# Patient Record
Sex: FEMALE | Race: WHITE | Employment: UNEMPLOYED | URBAN - METROPOLITAN AREA
[De-identification: names, ages, dates, MRNs, and addresses within clinical notes are randomized per-mention and may not be internally consistent; named-entity substitution may affect disease eponyms.]

---

## 2019-07-17 ENCOUNTER — HOSPITAL ENCOUNTER (EMERGENCY)
Age: 34
Discharge: HOME | End: 2019-07-17
Attending: EMERGENCY MEDICINE

## 2019-07-17 VITALS
HEIGHT: 69 IN | WEIGHT: 123 LBS | TEMPERATURE: 98.3 F | SYSTOLIC BLOOD PRESSURE: 139 MMHG | BODY MASS INDEX: 18.22 KG/M2 | RESPIRATION RATE: 16 BRPM | OXYGEN SATURATION: 100 % | HEART RATE: 79 BPM | DIASTOLIC BLOOD PRESSURE: 80 MMHG

## 2019-07-17 NOTE — ED NOTES
On way back to treatment room patient got a phone call. Child going to camp and left permission slip in car. Went to car. Registration person states she saw her drive away.      Zi Mar RN  07/17/19 [id number]

## 2025-03-30 ENCOUNTER — APPOINTMENT (OUTPATIENT)
Dept: CT IMAGING | Age: 40
DRG: 720 | End: 2025-03-30
Payer: COMMERCIAL

## 2025-03-30 ENCOUNTER — HOSPITAL ENCOUNTER (INPATIENT)
Age: 40
LOS: 2 days | Discharge: ANOTHER HOSPITAL | DRG: 720 | End: 2025-04-01
Attending: EMERGENCY MEDICINE | Admitting: INTERNAL MEDICINE
Payer: COMMERCIAL

## 2025-03-30 ENCOUNTER — APPOINTMENT (OUTPATIENT)
Dept: GENERAL RADIOLOGY | Age: 40
DRG: 720 | End: 2025-03-30
Payer: COMMERCIAL

## 2025-03-30 DIAGNOSIS — A41.9 SEPTICEMIA (HCC): ICD-10-CM

## 2025-03-30 DIAGNOSIS — J18.9 PNEUMONIA DUE TO INFECTIOUS ORGANISM, UNSPECIFIED LATERALITY, UNSPECIFIED PART OF LUNG: ICD-10-CM

## 2025-03-30 DIAGNOSIS — R41.82 ALTERED MENTAL STATUS, UNSPECIFIED ALTERED MENTAL STATUS TYPE: Primary | ICD-10-CM

## 2025-03-30 PROBLEM — I76 SEPTIC EMBOLISM (HCC): Status: ACTIVE | Noted: 2025-03-30

## 2025-03-30 PROBLEM — R47.81 SLURRED SPEECH: Status: ACTIVE | Noted: 2025-03-30

## 2025-03-30 PROBLEM — A59.9 INFECTION DUE TO TRICHOMONAS: Status: ACTIVE | Noted: 2025-03-30

## 2025-03-30 PROBLEM — R31.9 HEMATURIA: Status: ACTIVE | Noted: 2025-03-30

## 2025-03-30 PROBLEM — D72.829 LEUKOCYTOSIS: Status: ACTIVE | Noted: 2025-03-30

## 2025-03-30 PROBLEM — Z86.14 HISTORY OF MRSA INFECTION: Status: ACTIVE | Noted: 2025-03-30

## 2025-03-30 PROBLEM — B18.2 CHRONIC HEPATITIS C (HCC): Status: ACTIVE | Noted: 2025-03-30

## 2025-03-30 LAB
ALBUMIN SERPL-MCNC: 2.4 G/DL (ref 3.5–5.2)
ALBUMIN/GLOB SERPL: 0.5 {RATIO} (ref 1–2.5)
ALLEN TEST: POSITIVE
ALP SERPL-CCNC: 554 U/L (ref 35–104)
ALT SERPL-CCNC: 37 U/L (ref 10–35)
AMMONIA PLAS-SCNC: 31 UMOL/L (ref 11–51)
AMMONIA PLAS-SCNC: 32 UMOL/L (ref 11–51)
AMPHET UR QL SCN: NEGATIVE
ANION GAP SERPL CALCULATED.3IONS-SCNC: 11 MMOL/L (ref 9–16)
AST SERPL-CCNC: 64 U/L (ref 10–35)
BARBITURATES UR QL SCN: NEGATIVE
BASOPHILS # BLD: 0 K/UL (ref 0–0.2)
BASOPHILS # BLD: NORMAL K/UL (ref 0–0.2)
BASOPHILS NFR BLD: 0 %
BASOPHILS NFR BLD: NORMAL % (ref 0–2)
BENZODIAZ UR QL: NEGATIVE
BILIRUB SERPL-MCNC: 0.9 MG/DL (ref 0–1.2)
BILIRUB UR QL STRIP: NEGATIVE
BUN SERPL-MCNC: 25 MG/DL (ref 6–20)
CALCIUM SERPL-MCNC: 10.2 MG/DL (ref 8.6–10.4)
CANNABINOIDS UR QL SCN: POSITIVE
CHLORIDE SERPL-SCNC: 103 MMOL/L (ref 98–107)
CLARITY UR: ABNORMAL
CO2 SERPL-SCNC: 19 MMOL/L (ref 20–31)
COCAINE UR QL SCN: POSITIVE
COLOR UR: YELLOW
CREAT SERPL-MCNC: 1 MG/DL (ref 0.5–0.9)
EOSINOPHIL # BLD: 0 K/UL (ref 0–0.4)
EOSINOPHIL # BLD: NORMAL K/UL (ref 0–0.4)
EOSINOPHILS RELATIVE PERCENT: 0 % (ref 1–4)
EOSINOPHILS RELATIVE PERCENT: NORMAL % (ref 1–4)
EPI CELLS #/AREA URNS HPF: ABNORMAL /HPF (ref 0–5)
ERYTHROCYTE [DISTWIDTH] IN BLOOD BY AUTOMATED COUNT: 14.1 % (ref 11.8–14.4)
ERYTHROCYTE [DISTWIDTH] IN BLOOD BY AUTOMATED COUNT: NORMAL % (ref 11.5–14.5)
ETHANOL PERCENT: NORMAL %
ETHANOLAMINE SERPL-MCNC: <10 MG/DL (ref 0–0.08)
FENTANYL UR QL: NEGATIVE
GFR, ESTIMATED: 73 ML/MIN/1.73M2
GLUCOSE BLD-MCNC: 117 MG/DL (ref 65–105)
GLUCOSE SERPL-MCNC: 113 MG/DL (ref 74–99)
GLUCOSE UR STRIP-MCNC: NEGATIVE MG/DL
HCT VFR BLD AUTO: 32.2 % (ref 36.3–47.1)
HCT VFR BLD AUTO: NORMAL % (ref 36–46)
HGB BLD-MCNC: 10.8 G/DL (ref 11.9–15.1)
HGB BLD-MCNC: NORMAL G/DL (ref 12–16)
HGB UR QL STRIP.AUTO: ABNORMAL
IMM GRANULOCYTES # BLD AUTO: 0.16 K/UL (ref 0–0.3)
IMM GRANULOCYTES NFR BLD: 1 %
INR PPP: 1.3
KETONES UR STRIP-MCNC: NEGATIVE MG/DL
LACTATE BLDV-SCNC: 1.3 MMOL/L (ref 0.5–1.9)
LACTATE BLDV-SCNC: 1.4 MMOL/L (ref 0.5–1.9)
LACTATE BLDV-SCNC: 1.7 MMOL/L (ref 0.5–1.9)
LEUKOCYTE ESTERASE UR QL STRIP: ABNORMAL
LYMPHOCYTES NFR BLD: 0.78 K/UL (ref 1–4.8)
LYMPHOCYTES NFR BLD: NORMAL K/UL (ref 1–4.8)
LYMPHOCYTES RELATIVE PERCENT: 5 % (ref 24–44)
LYMPHOCYTES RELATIVE PERCENT: NORMAL % (ref 24–44)
MCH RBC QN AUTO: 31.1 PG (ref 25.2–33.5)
MCH RBC QN AUTO: NORMAL PG (ref 26–34)
MCHC RBC AUTO-ENTMCNC: 33.5 G/DL (ref 28.4–34.8)
MCHC RBC AUTO-ENTMCNC: NORMAL G/DL (ref 31–37)
MCV RBC AUTO: 92.8 FL (ref 82.6–102.9)
MCV RBC AUTO: NORMAL FL (ref 80–100)
METHADONE UR QL: NEGATIVE
MONOCYTES NFR BLD: 0.31 K/UL (ref 0.2–0.8)
MONOCYTES NFR BLD: 2 % (ref 1–7)
MONOCYTES NFR BLD: NORMAL % (ref 1–7)
MONOCYTES NFR BLD: NORMAL K/UL (ref 0.2–0.8)
NEGATIVE BASE EXCESS, ART: 3.2 MMOL/L (ref 0–2)
NEUTROPHILS NFR BLD: 92 % (ref 36–66)
NEUTROPHILS NFR BLD: NORMAL % (ref 36–66)
NEUTS SEG NFR BLD: 14.35 K/UL (ref 1.8–7.7)
NEUTS SEG NFR BLD: NORMAL K/UL (ref 1.8–7.7)
NITRITE UR QL STRIP: NEGATIVE
NRBC BLD-RTO: 0 PER 100 WBC
O2 DELIVERY DEVICE: ABNORMAL
OPIATES UR QL SCN: NEGATIVE
OXYCODONE UR QL SCN: NEGATIVE
PCP UR QL SCN: NEGATIVE
PH UR STRIP: 5.5 [PH] (ref 5–8)
PLATELET # BLD AUTO: 239 K/UL (ref 138–453)
PLATELET # BLD AUTO: NORMAL K/UL (ref 130–400)
PLATELETS.RETICULATED NFR BLD AUTO: NORMAL % (ref 1.1–10.3)
PMV BLD AUTO: 9.9 FL (ref 8.1–13.5)
POC HCO3: 21.2 MMOL/L (ref 21–28)
POC O2 SATURATION: 95.1 % (ref 94–98)
POC PCO2: 34.8 MM HG (ref 35–48)
POC PH: 7.39 (ref 7.35–7.45)
POC PO2: 75.7 MM HG (ref 83–108)
POTASSIUM SERPL-SCNC: 4.3 MMOL/L (ref 3.7–5.3)
PROT SERPL-MCNC: 7.2 G/DL (ref 6.6–8.7)
PROT UR STRIP-MCNC: ABNORMAL MG/DL
PROTHROMBIN TIME: 16.3 SEC (ref 11.5–14.2)
RBC # BLD AUTO: 3.47 M/UL (ref 3.95–5.11)
RBC # BLD AUTO: NORMAL M/UL (ref 4–5.2)
RBC #/AREA URNS HPF: ABNORMAL /HPF (ref 0–2)
SAMPLE SITE: ABNORMAL
SODIUM SERPL-SCNC: 133 MMOL/L (ref 136–145)
SP GR UR STRIP: 1.02 (ref 1–1.03)
TEST INFORMATION: ABNORMAL
TRICHOMONAS #/AREA URNS HPF: POSITIVE /[HPF]
UROBILINOGEN UR STRIP-ACNC: NORMAL EU/DL (ref 0–1)
WBC #/AREA URNS HPF: ABNORMAL /HPF (ref 0–5)
WBC OTHER # BLD: 15.6 K/UL (ref 3.5–11.3)
WBC OTHER # BLD: NORMAL K/UL (ref 3.5–11)
YEAST URNS QL MICRO: ABNORMAL

## 2025-03-30 PROCEDURE — 82803 BLOOD GASES ANY COMBINATION: CPT

## 2025-03-30 PROCEDURE — 2000000000 HC ICU R&B

## 2025-03-30 PROCEDURE — 6360000002 HC RX W HCPCS: Performed by: EMERGENCY MEDICINE

## 2025-03-30 PROCEDURE — 36600 WITHDRAWAL OF ARTERIAL BLOOD: CPT

## 2025-03-30 PROCEDURE — 87154 CUL TYP ID BLD PTHGN 6+ TRGT: CPT

## 2025-03-30 PROCEDURE — 87205 SMEAR GRAM STAIN: CPT

## 2025-03-30 PROCEDURE — 87070 CULTURE OTHR SPECIMN AEROBIC: CPT

## 2025-03-30 PROCEDURE — 2500000003 HC RX 250 WO HCPCS: Performed by: EMERGENCY MEDICINE

## 2025-03-30 PROCEDURE — 86403 PARTICLE AGGLUT ANTBDY SCRN: CPT

## 2025-03-30 PROCEDURE — 99285 EMERGENCY DEPT VISIT HI MDM: CPT

## 2025-03-30 PROCEDURE — 85610 PROTHROMBIN TIME: CPT

## 2025-03-30 PROCEDURE — 99223 1ST HOSP IP/OBS HIGH 75: CPT

## 2025-03-30 PROCEDURE — 80307 DRUG TEST PRSMV CHEM ANLYZR: CPT

## 2025-03-30 PROCEDURE — 70450 CT HEAD/BRAIN W/O DYE: CPT

## 2025-03-30 PROCEDURE — 87641 MR-STAPH DNA AMP PROBE: CPT

## 2025-03-30 PROCEDURE — 83605 ASSAY OF LACTIC ACID: CPT

## 2025-03-30 PROCEDURE — G0480 DRUG TEST DEF 1-7 CLASSES: HCPCS

## 2025-03-30 PROCEDURE — 2580000003 HC RX 258: Performed by: EMERGENCY MEDICINE

## 2025-03-30 PROCEDURE — 2580000003 HC RX 258

## 2025-03-30 PROCEDURE — 87186 SC STD MICRODIL/AGAR DIL: CPT

## 2025-03-30 PROCEDURE — 80053 COMPREHEN METABOLIC PANEL: CPT

## 2025-03-30 PROCEDURE — 85025 COMPLETE CBC W/AUTO DIFF WBC: CPT

## 2025-03-30 PROCEDURE — 96365 THER/PROPH/DIAG IV INF INIT: CPT

## 2025-03-30 PROCEDURE — 82947 ASSAY GLUCOSE BLOOD QUANT: CPT

## 2025-03-30 PROCEDURE — 94761 N-INVAS EAR/PLS OXIMETRY MLT: CPT

## 2025-03-30 PROCEDURE — 6360000002 HC RX W HCPCS

## 2025-03-30 PROCEDURE — 87040 BLOOD CULTURE FOR BACTERIA: CPT

## 2025-03-30 PROCEDURE — 96375 TX/PRO/DX INJ NEW DRUG ADDON: CPT

## 2025-03-30 PROCEDURE — 87086 URINE CULTURE/COLONY COUNT: CPT

## 2025-03-30 PROCEDURE — 82140 ASSAY OF AMMONIA: CPT

## 2025-03-30 PROCEDURE — 81001 URINALYSIS AUTO W/SCOPE: CPT

## 2025-03-30 PROCEDURE — 71250 CT THORAX DX C-: CPT

## 2025-03-30 PROCEDURE — 71045 X-RAY EXAM CHEST 1 VIEW: CPT

## 2025-03-30 PROCEDURE — 93005 ELECTROCARDIOGRAM TRACING: CPT | Performed by: EMERGENCY MEDICINE

## 2025-03-30 PROCEDURE — 36415 COLL VENOUS BLD VENIPUNCTURE: CPT

## 2025-03-30 PROCEDURE — 2500000003 HC RX 250 WO HCPCS

## 2025-03-30 RX ORDER — SODIUM CHLORIDE 9 MG/ML
INJECTION, SOLUTION INTRAVENOUS PRN
Status: DISCONTINUED | OUTPATIENT
Start: 2025-03-30 | End: 2025-04-02 | Stop reason: HOSPADM

## 2025-03-30 RX ORDER — METRONIDAZOLE 500 MG/100ML
500 INJECTION, SOLUTION INTRAVENOUS EVERY 12 HOURS
Status: DISCONTINUED | OUTPATIENT
Start: 2025-03-30 | End: 2025-03-31

## 2025-03-30 RX ORDER — SODIUM CHLORIDE 0.9 % (FLUSH) 0.9 %
5-40 SYRINGE (ML) INJECTION EVERY 12 HOURS SCHEDULED
Status: DISCONTINUED | OUTPATIENT
Start: 2025-03-30 | End: 2025-04-02 | Stop reason: HOSPADM

## 2025-03-30 RX ORDER — CLONAZEPAM 0.5 MG/1
0.5 TABLET ORAL 3 TIMES DAILY PRN
Status: DISCONTINUED | OUTPATIENT
Start: 2025-03-30 | End: 2025-04-02 | Stop reason: HOSPADM

## 2025-03-30 RX ORDER — ENOXAPARIN SODIUM 100 MG/ML
40 INJECTION SUBCUTANEOUS DAILY
Status: DISCONTINUED | OUTPATIENT
Start: 2025-03-30 | End: 2025-04-02 | Stop reason: HOSPADM

## 2025-03-30 RX ORDER — DOXEPIN HYDROCHLORIDE 10 MG/1
10 CAPSULE ORAL NIGHTLY
COMMUNITY

## 2025-03-30 RX ORDER — GABAPENTIN 300 MG/1
300 CAPSULE ORAL 3 TIMES DAILY
COMMUNITY

## 2025-03-30 RX ORDER — FUROSEMIDE 20 MG/1
20 TABLET ORAL EVERY OTHER DAY
COMMUNITY

## 2025-03-30 RX ORDER — ONDANSETRON 2 MG/ML
4 INJECTION INTRAMUSCULAR; INTRAVENOUS ONCE
Status: COMPLETED | OUTPATIENT
Start: 2025-03-30 | End: 2025-03-30

## 2025-03-30 RX ORDER — SPIRONOLACTONE 25 MG/1
25 TABLET ORAL DAILY
COMMUNITY

## 2025-03-30 RX ORDER — CLONIDINE HYDROCHLORIDE 0.1 MG/1
0.1 TABLET ORAL 2 TIMES DAILY
COMMUNITY

## 2025-03-30 RX ORDER — SODIUM CHLORIDE 9 MG/ML
INJECTION, SOLUTION INTRAVENOUS CONTINUOUS
Status: DISCONTINUED | OUTPATIENT
Start: 2025-03-30 | End: 2025-03-31

## 2025-03-30 RX ORDER — AZITHROMYCIN 250 MG/1
1000 TABLET, FILM COATED ORAL ONCE
Status: DISCONTINUED | OUTPATIENT
Start: 2025-03-30 | End: 2025-03-31

## 2025-03-30 RX ORDER — SPIRONOLACTONE 25 MG/1
25 TABLET ORAL DAILY
Status: DISCONTINUED | OUTPATIENT
Start: 2025-03-30 | End: 2025-04-02 | Stop reason: HOSPADM

## 2025-03-30 RX ORDER — DULOXETIN HYDROCHLORIDE 60 MG/1
60 CAPSULE, DELAYED RELEASE ORAL DAILY
Status: DISCONTINUED | OUTPATIENT
Start: 2025-03-30 | End: 2025-04-02 | Stop reason: HOSPADM

## 2025-03-30 RX ORDER — ACETAMINOPHEN 325 MG/1
650 TABLET ORAL EVERY 6 HOURS PRN
Status: DISCONTINUED | OUTPATIENT
Start: 2025-03-30 | End: 2025-04-02 | Stop reason: HOSPADM

## 2025-03-30 RX ORDER — CLONIDINE HYDROCHLORIDE 0.1 MG/1
0.1 TABLET ORAL 2 TIMES DAILY
Status: DISCONTINUED | OUTPATIENT
Start: 2025-03-30 | End: 2025-04-02 | Stop reason: HOSPADM

## 2025-03-30 RX ORDER — ACETAMINOPHEN 650 MG/1
650 SUPPOSITORY RECTAL EVERY 6 HOURS PRN
Status: DISCONTINUED | OUTPATIENT
Start: 2025-03-30 | End: 2025-04-02 | Stop reason: HOSPADM

## 2025-03-30 RX ORDER — LORAZEPAM 2 MG/ML
1 INJECTION INTRAMUSCULAR EVERY 6 HOURS PRN
Status: DISCONTINUED | OUTPATIENT
Start: 2025-03-30 | End: 2025-03-30

## 2025-03-30 RX ORDER — CLONAZEPAM 0.5 MG/1
0.5 TABLET ORAL 3 TIMES DAILY PRN
COMMUNITY

## 2025-03-30 RX ORDER — SODIUM CHLORIDE 0.9 % (FLUSH) 0.9 %
5-40 SYRINGE (ML) INJECTION PRN
Status: DISCONTINUED | OUTPATIENT
Start: 2025-03-30 | End: 2025-04-02 | Stop reason: HOSPADM

## 2025-03-30 RX ORDER — DULOXETIN HYDROCHLORIDE 60 MG/1
60 CAPSULE, DELAYED RELEASE ORAL DAILY
COMMUNITY

## 2025-03-30 RX ORDER — GABAPENTIN 300 MG/1
300 CAPSULE ORAL 3 TIMES DAILY
Status: DISCONTINUED | OUTPATIENT
Start: 2025-03-30 | End: 2025-04-02 | Stop reason: HOSPADM

## 2025-03-30 RX ORDER — SODIUM CHLORIDE, SODIUM LACTATE, POTASSIUM CHLORIDE, AND CALCIUM CHLORIDE .6; .31; .03; .02 G/100ML; G/100ML; G/100ML; G/100ML
30 INJECTION, SOLUTION INTRAVENOUS ONCE
Status: COMPLETED | OUTPATIENT
Start: 2025-03-30 | End: 2025-03-30

## 2025-03-30 RX ORDER — METRONIDAZOLE 500 MG/1
500 TABLET ORAL EVERY 12 HOURS SCHEDULED
Status: CANCELLED | OUTPATIENT
Start: 2025-03-30

## 2025-03-30 RX ORDER — DOXEPIN HYDROCHLORIDE 10 MG/1
10 CAPSULE ORAL NIGHTLY
Status: DISCONTINUED | OUTPATIENT
Start: 2025-03-30 | End: 2025-04-02 | Stop reason: HOSPADM

## 2025-03-30 RX ORDER — METOPROLOL TARTRATE 1 MG/ML
5 INJECTION, SOLUTION INTRAVENOUS EVERY 6 HOURS PRN
Status: DISCONTINUED | OUTPATIENT
Start: 2025-03-30 | End: 2025-04-02 | Stop reason: HOSPADM

## 2025-03-30 RX ORDER — FUROSEMIDE 20 MG/1
20 TABLET ORAL EVERY OTHER DAY
Status: DISCONTINUED | OUTPATIENT
Start: 2025-03-30 | End: 2025-04-02 | Stop reason: HOSPADM

## 2025-03-30 RX ORDER — METRONIDAZOLE 500 MG/1
2000 TABLET ORAL ONCE
Status: DISCONTINUED | OUTPATIENT
Start: 2025-03-30 | End: 2025-03-31

## 2025-03-30 RX ADMIN — SODIUM CHLORIDE, SODIUM LACTATE, POTASSIUM CHLORIDE, AND CALCIUM CHLORIDE 1740 ML: .6; .31; .03; .02 INJECTION, SOLUTION INTRAVENOUS at 10:50

## 2025-03-30 RX ADMIN — SODIUM CHLORIDE, PRESERVATIVE FREE 10 ML: 5 INJECTION INTRAVENOUS at 21:24

## 2025-03-30 RX ADMIN — CEFEPIME 2000 MG: 2 INJECTION, POWDER, FOR SOLUTION INTRAVENOUS at 11:46

## 2025-03-30 RX ADMIN — SODIUM CHLORIDE: 0.9 INJECTION, SOLUTION INTRAVENOUS at 16:16

## 2025-03-30 RX ADMIN — ONDANSETRON 4 MG: 2 INJECTION, SOLUTION INTRAMUSCULAR; INTRAVENOUS at 10:29

## 2025-03-30 RX ADMIN — ENOXAPARIN SODIUM 40 MG: 100 INJECTION SUBCUTANEOUS at 16:25

## 2025-03-30 RX ADMIN — METRONIDAZOLE 500 MG: 500 INJECTION, SOLUTION INTRAVENOUS at 16:21

## 2025-03-30 RX ADMIN — METOPROLOL TARTRATE 5 MG: 5 INJECTION INTRAVENOUS at 20:24

## 2025-03-30 RX ADMIN — CEFEPIME 2000 MG: 2 INJECTION, POWDER, FOR SOLUTION INTRAVENOUS at 18:36

## 2025-03-30 RX ADMIN — SODIUM CHLORIDE: 0.9 INJECTION, SOLUTION INTRAVENOUS at 23:43

## 2025-03-30 RX ADMIN — Medication 1500 MG: at 11:41

## 2025-03-30 ASSESSMENT — PAIN SCALES - GENERAL: PAINLEVEL_OUTOF10: 0

## 2025-03-30 NOTE — H&P
[facility]  Office: [phone]  Edmar Luong DO, Johnny Allison DO, Beny Hogan DO, Jayro Delgadillo DO, Pablito Driscoll MD, Azra Ward MD, Wilman Nathan MD, Haylie Frazier MD,  Nitesh Rubin MD, Suma Ramos MD, Thomas Ceballos MD,  Jovita Ryan DO, Niru Rodriguez MD, Harshal Lane MD, Micha Luong DO, Elicia Hermosillo MD,  Nigel Reaves DO, Melissa Maya MD, Jaye Simpson MD, Kb Fuentes MD,  Ernie Cervantes MD, Fiordaliza Faust MD, Marilyn Zambrano MD, Gisela Ramirez MD, Juan Carlos Pacheco MD, Kasey Yee MD, Renzo Sandoval DO, Familia Arroyo MD, Jovita Reynoso MD, Mohsin Reza, MD, Noa Boston MD, Shirley Waterhouse, CNP,  Geraldine Mejia CNP, Renzo Washburn, CNP,  Laura Perez, DNP, Kalina Mercado, CNP, Samantha Noland, CNP, Rubi Cuevas, CNP, Natalya Faye, CNP, Karli Gabriel PA-C, Libia Ochoa, CNP, Vivian Mccarty, CNP,  Ileana Rodriguez, CNP, Aida Cox, CNP, Alda Timmons, CNP,  Pat Oreilly, CNS, Rupa Zhao CNP, Toshia Pablo, CNP,   Violeta Bennett, CNP         [facility]   IN-PATIENT SERVICE   [facility]    HISTORY AND PHYSICAL EXAMINATION            Date:   3/30/2025  Patient name:  Amy Wheat  Date of admission:  3/30/2025  9:45 AM  MRN:   [id number]  Account:  [id number]  YOB: 1985  PCP:    No primary care provider on file.  Room:   [address] [place] [address]  Code Status:    Full Code    Chief Complaint:     Chief Complaint   Patient presents with    Aphasia     Pt presents to ED from home for slurred speech and confusion. Per EMS family reports this started today but did not notice yesterday. Reports stroke 7 months ago and discharged from nursing facility 3wks ago. States only deficits from stroke are slurred speech, but more pronounced today.    Wound Check     History Obtained From:     patient, electronic medical record    History of Present Illness:     Amy Wheat is a 39 y.o. Non- / non

## 2025-03-30 NOTE — DISCHARGE INSTR - COC
Continuity of Care Form    Patient Name: Amy Wheat   :  1985  MRN:  [id number]    Admit date:  3/30/2025  Discharge date:  ***    Code Status Order: No Order   Advance Directives:     Admitting Physician:  No admitting provider for patient encounter.  PCP: No primary care provider on file.    Discharging Nurse: ***  Discharging Hospital Unit/Room#: STA23/23  Discharging Unit Phone Number: ***    Emergency Contact:   Extended Emergency Contact Information  Primary Emergency Contact: Oliver Wheat  [place]: [place] [address]  Home Phone: [phone]  Relation: Other  Secondary Emergency Contact: Amy Wheat  Address: [address]           [place] [address]  Home Phone: [phone]  Relation: Parent    Past Surgical History:  No past surgical history on file.    Immunization History:     There is no immunization history on file for this patient.    Active Problems:  There is no problem list on file for this patient.      Isolation/Infection:   Isolation            No Isolation          Patient Infection Status    None to display         Nurse Assessment:  Last Vital Signs: BP (!) 126/94   Pulse (!) 110   Temp 97.9 °F (36.6 °C) (Axillary)   Resp 28   Wt 58 kg (127 lb 13.9 oz)   SpO2 94%   BMI 18.88 kg/m²     Last documented pain score (0-10 scale):    Last Weight:   Wt Readings from Last 1 Encounters:   25 58 kg (127 lb 13.9 oz)     Mental Status:  {IP PT MENTAL STATUS:96923}    IV Access:  { DOMINIC IV ACCESS:869750222}    Nursing Mobility/ADLs:  Walking   {CHP DME ADLs:216008338}  Transfer  {CHP DME ADLs:151713476}  Bathing  {CHP DME ADLs:581967282}  Dressing  {CHP DME ADLs:863800945}  Toileting  {CHP DME ADLs:520988546}  Feeding  {CHP DME ADLs:658277238}  Med Admin  {CHP DME ADLs:196167328}  Med Delivery   { DOMINIC MED Delivery:228375134}    Wound Care Documentation and Therapy:        Elimination:  Continence:   Bowel: {YES / NO:}  Bladder: {YES / NO:}  Urinary

## 2025-03-30 NOTE — ED NOTES
Pt presents to ED via EMS from home.  Per EMS, pts friend (Trace) called for slurred speech. States pt had endocarditis (mitral valve), sepsis, and a stroke approximately 7 months ago.   States she was in a nursing facility for PT/OT and discharged 3wks ago. States her only deficit was slurred speech but was very mild.  States last night her speech was baseline but this morning it was worse and she was altered.  Pt answers some questions but appears lethargic.  Pt also has an open wound to her chest. Possibly from trach in the past.  Pt on full monitor.

## 2025-03-30 NOTE — ED NOTES
Attempted to give PO antibiotics. Pt unable to stay awake or drink fluids at this time. Dr. Lovett notified.

## 2025-03-31 ENCOUNTER — APPOINTMENT (OUTPATIENT)
Age: 40
DRG: 720 | End: 2025-03-31
Payer: COMMERCIAL

## 2025-03-31 ENCOUNTER — APPOINTMENT (OUTPATIENT)
Dept: CT IMAGING | Age: 40
DRG: 720 | End: 2025-03-31
Payer: COMMERCIAL

## 2025-03-31 ENCOUNTER — APPOINTMENT (OUTPATIENT)
Dept: MRI IMAGING | Age: 40
DRG: 720 | End: 2025-03-31
Payer: COMMERCIAL

## 2025-03-31 PROBLEM — I33.0 INFECTIVE ENDOCARDITIS: Status: ACTIVE | Noted: 2025-03-31

## 2025-03-31 PROBLEM — I69.30 CEREBRAL MULTI-INFARCT STATE: Status: ACTIVE | Noted: 2025-03-31

## 2025-03-31 PROBLEM — G93.40 ACUTE ENCEPHALOPATHY: Status: ACTIVE | Noted: 2025-03-31

## 2025-03-31 PROBLEM — I66.9 CEREBRAL SEPTIC EMBOLI (HCC): Status: ACTIVE | Noted: 2025-03-31

## 2025-03-31 PROBLEM — A41.02 SEPSIS DUE TO METHICILLIN RESISTANT STAPHYLOCOCCUS AUREUS (MRSA) (HCC): Status: ACTIVE | Noted: 2025-03-31

## 2025-03-31 PROBLEM — I76 CEREBRAL SEPTIC EMBOLI (HCC): Status: ACTIVE | Noted: 2025-03-31

## 2025-03-31 LAB
ALBUMIN SERPL-MCNC: 2.2 G/DL (ref 3.5–5.2)
ALBUMIN/GLOB SERPL: 0.5 {RATIO} (ref 1–2.5)
ALP SERPL-CCNC: 447 U/L (ref 35–104)
ALT SERPL-CCNC: 40 U/L (ref 10–35)
ANION GAP SERPL CALCULATED.3IONS-SCNC: 11 MMOL/L (ref 9–16)
AST SERPL-CCNC: 76 U/L (ref 10–35)
BASOPHILS # BLD: 0.07 K/UL (ref 0–0.2)
BASOPHILS NFR BLD: 0 % (ref 0–2)
BILIRUB SERPL-MCNC: 0.6 MG/DL (ref 0–1.2)
BUN SERPL-MCNC: 29 MG/DL (ref 6–20)
CALCIUM SERPL-MCNC: 9.5 MG/DL (ref 8.6–10.4)
CHLORIDE SERPL-SCNC: 106 MMOL/L (ref 98–107)
CO2 SERPL-SCNC: 18 MMOL/L (ref 20–31)
CREAT SERPL-MCNC: 0.9 MG/DL (ref 0.5–0.9)
ECHO AO ROOT DIAM: 2.6 CM
ECHO AO ROOT INDEX: 1.61 CM/M2
ECHO AV MEAN GRADIENT: 5 MMHG
ECHO AV MEAN VELOCITY: 1.1 M/S
ECHO AV PEAK GRADIENT: 10 MMHG
ECHO AV PEAK VELOCITY: 1.6 M/S
ECHO AV VELOCITY RATIO: 0.63
ECHO AV VTI: 32.5 CM
ECHO BSA: 1.61 M2
ECHO EST RA PRESSURE: 3 MMHG
ECHO LA AREA 4C: 24.2 CM2
ECHO LA DIAMETER INDEX: 2.48 CM/M2
ECHO LA DIAMETER: 4 CM
ECHO LA MAJOR AXIS: 6.8 CM
ECHO LA TO AORTIC ROOT RATIO: 1.54
ECHO LA VOL MOD A4C: 69 ML (ref 22–52)
ECHO LA VOLUME INDEX MOD A4C: 43 ML/M2 (ref 16–34)
ECHO LV E' LATERAL VELOCITY: 16.5 CM/S
ECHO LV E' SEPTAL VELOCITY: 12.3 CM/S
ECHO LV EDV A2C: 126 ML
ECHO LV EDV A4C: 111 ML
ECHO LV EDV INDEX A4C: 69 ML/M2
ECHO LV EDV NDEX A2C: 78 ML/M2
ECHO LV EF PHYSICIAN: 58 %
ECHO LV EJECTION FRACTION A2C: 58 %
ECHO LV EJECTION FRACTION A4C: 56 %
ECHO LV EJECTION FRACTION BIPLANE: 58 % (ref 55–100)
ECHO LV ESV A2C: 53 ML
ECHO LV ESV A4C: 48 ML
ECHO LV ESV INDEX A2C: 33 ML/M2
ECHO LV ESV INDEX A4C: 30 ML/M2
ECHO LV FRACTIONAL SHORTENING: 26 % (ref 28–44)
ECHO LV INTERNAL DIMENSION DIASTOLE INDEX: 2.86 CM/M2
ECHO LV INTERNAL DIMENSION DIASTOLIC: 4.6 CM (ref 3.9–5.3)
ECHO LV INTERNAL DIMENSION SYSTOLIC INDEX: 2.11 CM/M2
ECHO LV INTERNAL DIMENSION SYSTOLIC: 3.4 CM
ECHO LV IVSD: 1 CM (ref 0.6–0.9)
ECHO LV MASS 2D: 148.1 G (ref 67–162)
ECHO LV MASS INDEX 2D: 92 G/M2 (ref 43–95)
ECHO LV POSTERIOR WALL DIASTOLIC: 0.9 CM (ref 0.6–0.9)
ECHO LV RELATIVE WALL THICKNESS RATIO: 0.39
ECHO LVOT PEAK GRADIENT: 4 MMHG
ECHO LVOT PEAK VELOCITY: 1 M/S
ECHO MV A VELOCITY: 1.51 M/S
ECHO MV E DECELERATION TIME (DT): 137 MS
ECHO MV E VELOCITY: 1.58 M/S
ECHO MV E/A RATIO: 1.05
ECHO MV E/E' LATERAL: 9.58
ECHO MV E/E' RATIO (AVERAGED): 11.21
ECHO MV E/E' SEPTAL: 12.85
ECHO RIGHT VENTRICULAR SYSTOLIC PRESSURE (RVSP): 65 MMHG
ECHO TV REGURGITANT MAX VELOCITY: 3.94 M/S
ECHO TV REGURGITANT PEAK GRADIENT: 62 MMHG
EKG ATRIAL RATE: 119 BPM
EKG P AXIS: 48 DEGREES
EKG P-R INTERVAL: 138 MS
EKG Q-T INTERVAL: 306 MS
EKG QRS DURATION: 88 MS
EKG QTC CALCULATION (BAZETT): 430 MS
EKG R AXIS: 85 DEGREES
EKG T AXIS: 64 DEGREES
EKG VENTRICULAR RATE: 119 BPM
EOSINOPHIL # BLD: <0.03 K/UL (ref 0–0.44)
EOSINOPHILS RELATIVE PERCENT: 0 % (ref 1–4)
ERYTHROCYTE [DISTWIDTH] IN BLOOD BY AUTOMATED COUNT: 14.3 % (ref 11.8–14.4)
GFR, ESTIMATED: 80 ML/MIN/1.73M2
GLUCOSE BLD-MCNC: 103 MG/DL (ref 65–105)
GLUCOSE BLD-MCNC: 104 MG/DL (ref 65–105)
GLUCOSE BLD-MCNC: 116 MG/DL (ref 65–105)
GLUCOSE BLD-MCNC: 121 MG/DL (ref 65–105)
GLUCOSE SERPL-MCNC: 126 MG/DL (ref 74–99)
HCT VFR BLD AUTO: 32.2 % (ref 36.3–47.1)
HGB BLD-MCNC: 10.4 G/DL (ref 11.9–15.1)
IMM GRANULOCYTES # BLD AUTO: 0.15 K/UL (ref 0–0.3)
IMM GRANULOCYTES NFR BLD: 1 %
LYMPHOCYTES NFR BLD: 0.71 K/UL (ref 1.1–3.7)
LYMPHOCYTES RELATIVE PERCENT: 4 % (ref 24–43)
MCH RBC QN AUTO: 31 PG (ref 25.2–33.5)
MCHC RBC AUTO-ENTMCNC: 32.3 G/DL (ref 28.4–34.8)
MCV RBC AUTO: 96.1 FL (ref 82.6–102.9)
MICROORGANISM SPEC CULT: NO GROWTH
MONOCYTES NFR BLD: 0.76 K/UL (ref 0.1–1.2)
MONOCYTES NFR BLD: 5 % (ref 3–12)
MRSA, DNA, NASAL: ABNORMAL
NEUTROPHILS NFR BLD: 90 % (ref 36–65)
NEUTS SEG NFR BLD: 15.29 K/UL (ref 1.5–8.1)
NRBC BLD-RTO: 0 PER 100 WBC
PLATELET # BLD AUTO: 174 K/UL (ref 138–453)
PMV BLD AUTO: 10.3 FL (ref 8.1–13.5)
POTASSIUM SERPL-SCNC: 4.4 MMOL/L (ref 3.7–5.3)
PROT SERPL-MCNC: 6.7 G/DL (ref 6.6–8.7)
RBC # BLD AUTO: 3.35 M/UL (ref 3.95–5.11)
SODIUM SERPL-SCNC: 135 MMOL/L (ref 136–145)
SPECIMEN DESCRIPTION: ABNORMAL
SPECIMEN DESCRIPTION: NORMAL
VANCOMYCIN SERPL-MCNC: 5.6 UG/ML (ref 5–40)
WBC OTHER # BLD: 17 K/UL (ref 3.5–11.3)

## 2025-03-31 PROCEDURE — 99255 IP/OBS CONSLTJ NEW/EST HI 80: CPT | Performed by: NURSE PRACTITIONER

## 2025-03-31 PROCEDURE — 6360000002 HC RX W HCPCS

## 2025-03-31 PROCEDURE — 94761 N-INVAS EAR/PLS OXIMETRY MLT: CPT

## 2025-03-31 PROCEDURE — 2500000003 HC RX 250 WO HCPCS

## 2025-03-31 PROCEDURE — 6360000002 HC RX W HCPCS: Performed by: PSYCHIATRY & NEUROLOGY

## 2025-03-31 PROCEDURE — 70551 MRI BRAIN STEM W/O DYE: CPT

## 2025-03-31 PROCEDURE — 80053 COMPREHEN METABOLIC PANEL: CPT

## 2025-03-31 PROCEDURE — 82947 ASSAY GLUCOSE BLOOD QUANT: CPT

## 2025-03-31 PROCEDURE — 99232 SBSQ HOSP IP/OBS MODERATE 35: CPT | Performed by: INTERNAL MEDICINE

## 2025-03-31 PROCEDURE — 93306 TTE W/DOPPLER COMPLETE: CPT

## 2025-03-31 PROCEDURE — 36415 COLL VENOUS BLD VENIPUNCTURE: CPT

## 2025-03-31 PROCEDURE — 2580000003 HC RX 258: Performed by: INTERNAL MEDICINE

## 2025-03-31 PROCEDURE — 99223 1ST HOSP IP/OBS HIGH 75: CPT | Performed by: PSYCHIATRY & NEUROLOGY

## 2025-03-31 PROCEDURE — 70498 CT ANGIOGRAPHY NECK: CPT

## 2025-03-31 PROCEDURE — 2580000003 HC RX 258: Performed by: PSYCHIATRY & NEUROLOGY

## 2025-03-31 PROCEDURE — 93010 ELECTROCARDIOGRAM REPORT: CPT | Performed by: INTERNAL MEDICINE

## 2025-03-31 PROCEDURE — 2000000000 HC ICU R&B

## 2025-03-31 PROCEDURE — 99223 1ST HOSP IP/OBS HIGH 75: CPT | Performed by: INTERNAL MEDICINE

## 2025-03-31 PROCEDURE — 80202 ASSAY OF VANCOMYCIN: CPT

## 2025-03-31 PROCEDURE — 2500000003 HC RX 250 WO HCPCS: Performed by: EMERGENCY MEDICINE

## 2025-03-31 PROCEDURE — 93306 TTE W/DOPPLER COMPLETE: CPT | Performed by: INTERNAL MEDICINE

## 2025-03-31 PROCEDURE — 6360000004 HC RX CONTRAST MEDICATION: Performed by: PSYCHIATRY & NEUROLOGY

## 2025-03-31 PROCEDURE — 2580000003 HC RX 258

## 2025-03-31 PROCEDURE — 85025 COMPLETE CBC W/AUTO DIFF WBC: CPT

## 2025-03-31 PROCEDURE — 2500000003 HC RX 250 WO HCPCS: Performed by: PSYCHIATRY & NEUROLOGY

## 2025-03-31 RX ORDER — LEVETIRACETAM 500 MG/5ML
500 INJECTION, SOLUTION, CONCENTRATE INTRAVENOUS EVERY 12 HOURS
Status: DISCONTINUED | OUTPATIENT
Start: 2025-04-01 | End: 2025-04-01

## 2025-03-31 RX ORDER — IOPAMIDOL 755 MG/ML
75 INJECTION, SOLUTION INTRAVASCULAR
Status: COMPLETED | OUTPATIENT
Start: 2025-03-31 | End: 2025-03-31

## 2025-03-31 RX ORDER — ERGOCALCIFEROL 1.25 MG/1
50000 CAPSULE, LIQUID FILLED ORAL WEEKLY
COMMUNITY

## 2025-03-31 RX ORDER — 0.9 % SODIUM CHLORIDE 0.9 %
100 INTRAVENOUS SOLUTION INTRAVENOUS ONCE
Status: COMPLETED | OUTPATIENT
Start: 2025-03-31 | End: 2025-03-31

## 2025-03-31 RX ORDER — SODIUM CHLORIDE 0.9 % (FLUSH) 0.9 %
10 SYRINGE (ML) INJECTION PRN
Status: DISCONTINUED | OUTPATIENT
Start: 2025-03-31 | End: 2025-04-02 | Stop reason: HOSPADM

## 2025-03-31 RX ORDER — LEVETIRACETAM 500 MG/5ML
1000 INJECTION, SOLUTION, CONCENTRATE INTRAVENOUS ONCE
Status: COMPLETED | OUTPATIENT
Start: 2025-03-31 | End: 2025-03-31

## 2025-03-31 RX ORDER — DEXTROSE MONOHYDRATE AND SODIUM CHLORIDE 5; .9 G/100ML; G/100ML
INJECTION, SOLUTION INTRAVENOUS CONTINUOUS
Status: DISCONTINUED | OUTPATIENT
Start: 2025-03-31 | End: 2025-04-01

## 2025-03-31 RX ORDER — CHOLECALCIFEROL (VITAMIN D3) 50 MCG
2000 TABLET ORAL DAILY
COMMUNITY

## 2025-03-31 RX ADMIN — METOPROLOL TARTRATE 5 MG: 5 INJECTION INTRAVENOUS at 13:49

## 2025-03-31 RX ADMIN — VANCOMYCIN HYDROCHLORIDE 750 MG: 750 INJECTION, POWDER, LYOPHILIZED, FOR SOLUTION INTRAVENOUS at 00:28

## 2025-03-31 RX ADMIN — SODIUM CHLORIDE: 0.9 INJECTION, SOLUTION INTRAVENOUS at 11:14

## 2025-03-31 RX ADMIN — ENOXAPARIN SODIUM 40 MG: 100 INJECTION SUBCUTANEOUS at 11:10

## 2025-03-31 RX ADMIN — DEXTROSE AND SODIUM CHLORIDE: 5; .9 INJECTION, SOLUTION INTRAVENOUS at 13:58

## 2025-03-31 RX ADMIN — SODIUM CHLORIDE 100 ML: 9 INJECTION, SOLUTION INTRAVENOUS at 20:54

## 2025-03-31 RX ADMIN — LEVETIRACETAM 1000 MG: 100 INJECTION INTRAVENOUS at 20:09

## 2025-03-31 RX ADMIN — IOPAMIDOL 75 ML: 755 INJECTION, SOLUTION INTRAVENOUS at 20:54

## 2025-03-31 RX ADMIN — SODIUM CHLORIDE, PRESERVATIVE FREE 10 ML: 5 INJECTION INTRAVENOUS at 09:40

## 2025-03-31 RX ADMIN — SODIUM CHLORIDE: 0.9 INJECTION, SOLUTION INTRAVENOUS at 07:53

## 2025-03-31 RX ADMIN — SODIUM CHLORIDE, PRESERVATIVE FREE 10 ML: 5 INJECTION INTRAVENOUS at 20:08

## 2025-03-31 RX ADMIN — SODIUM CHLORIDE, PRESERVATIVE FREE 10 ML: 5 INJECTION INTRAVENOUS at 13:51

## 2025-03-31 RX ADMIN — VANCOMYCIN HYDROCHLORIDE 1000 MG: 1 INJECTION, POWDER, LYOPHILIZED, FOR SOLUTION INTRAVENOUS at 21:09

## 2025-03-31 RX ADMIN — SODIUM CHLORIDE, PRESERVATIVE FREE 10 ML: 5 INJECTION INTRAVENOUS at 20:54

## 2025-03-31 RX ADMIN — CEFEPIME 2000 MG: 2 INJECTION, POWDER, FOR SOLUTION INTRAVENOUS at 14:00

## 2025-03-31 RX ADMIN — VANCOMYCIN HYDROCHLORIDE 1000 MG: 1 INJECTION, POWDER, LYOPHILIZED, FOR SOLUTION INTRAVENOUS at 11:26

## 2025-03-31 RX ADMIN — CEFEPIME 2000 MG: 2 INJECTION, POWDER, FOR SOLUTION INTRAVENOUS at 02:45

## 2025-03-31 RX ADMIN — METRONIDAZOLE 500 MG: 500 INJECTION, SOLUTION INTRAVENOUS at 05:15

## 2025-03-31 ASSESSMENT — PAIN SCALES - GENERAL: PAINLEVEL_OUTOF10: 0

## 2025-03-31 NOTE — CONSULTS
[facility]  Initial Consult Note  Date: 3/31/2025    Hospital day :1     Impression:   MRSA sepsis 3/30/2025  known history of MRSA tricuspid valve/mitral valve endocarditis with prior history of septic emboli  2D echocardiogram with severe mitral valve regurgitation and small vegetation that is mobile on the posterior leaflet, severe tricuspid valve regurgitation with mild pulmonary hypertension  Altered mental status   MRI of the brain showing multiple small areas of acute infarct scattered throughout the brain consistent with acute areas of infarct and embolic source most likely  Known history of right SC joint MRSA osteomyelitis/abscess  status post prior drainage procedure   open wound at the previous surgical site with MRSA noted consistent with recurrent infection  Bilateral pleural effusions  Trichomonas infection  Hepatitis C virus infection-has abnormal LFT  Anemia    Recommendations   Continue intravenous antimicrobial therapy with vancomycin  The patient has persistent versus recurrent endocarditis with embolic phenomena and has multiple acute infarcts likely explaining the altered mental status  The patient was not previously a surgical candidate and I would doubt that she is a surgical candidate at this time  Continue medical treatment    Chief complaint/reason for consultation:     Question Answer   Reason for Consult? IE     History of Present Illness:   Amy Wheat is a 39 y.o.-year-old female who was initially admitted on 3/30/2025.     Amy has previously been followed by the infectious disease group at the [facility].    She is a 39-year-old female who has longstanding history of drug abuse and has been treated for MRSA bacteremia [915, 917, 919] with septic emboli pulmonary spleen and renal, septic arthritis of the right sternoclavicular joint [MRSA isolated 916 status post I&D], mitral and tricuspid valve endocarditis with concern for 
  NEUROLOGY INPATIENT CONSULT NOTE    3/31/2025         Amy Wheat is a  39 y.o. female admitted on 3/30/2025 with  Septicemia ([facility]) [A41.9]  Altered mental status, unspecified altered mental status type [R41.82]  Pneumonia due to infectious organism, unspecified laterality, unspecified part of lung [J18.9]    Reason for consult: Altered mentation  Most of the history is obtained from patient's medical record as patient is unable to provide any history.  Chart is reviewed and patient is examined.   Briefly, this is a  39 y.o. female admitted on 3/30/2025 with altered mentation.   Chief complaint: Patient presents to ED from home for slurred speech and confusion.    Per EMS, family reports this \"started today but did not notice yesterday.  Reports stroke 7 months ago and discharged from nursing facility 3 weeks ago.  States only deficits from stroke or slurred speech, but more pronounced today\".  Patient initially presented to ER with concerns of altered mentation and slurred speech.   History includes hosp in 2024 for MRSA septicemia complicated by septic embolization of lungs, liver, brain and kidneys.  She was on mechanical vent at that time.  She was noted to have endocarditis of mitral and tricuspid valves on echo in 2024.  Urine tox(3/30/25): +ve for cocaine, cannabinoids.  X-ray chest 3/30/2025: Diffuse hazy bilateral airspace opacities representing multi lobar pneumonia.  CT head 3/30/2025: No acute intracranial abnormality.  Since admitted, patient remained unresponsive but noted to have spontaneous movements of all extremities.  As per medical record; patient has a longstanding history of drug abuse with septic emboli involving lungs and liver as stated above.  She also had mitral and tricuspid valve endocarditis with concern for perivalvular mitral abscesses.  Patient had large mobile vegetations on mitral valve and at times are being made to transfer the patient to [facility] or [facility].   Patient 
  Pulmonary Medicine and Critical Care Consult    Patient - Amy Wheat   MRN -  [id number]   [id number]   - 1985      Date of Admission -  3/30/2025  9:45 AM  Date of evaluation -  3/31/2025  Room - [facility]   Hospital Day - 1  Consulting - Pablito Driscoll MD Primary Care Physician - No primary care provider on file.     Reason for Consult      ICU management/confusion  Assessment/recommendations   Change in mental status?  Stroke versus seizure/history of stroke with right hemiparesis  Monitor mental status  Neurology on consult/MRI brain  N.p.o.  IV hydration shift to D5 normal at 75 an hour/will consider NG    Sepsis/MRSA sepsis/history of mitral and tricuspid endocarditis with MRSA septicemia with septic embolization to the lungs brain kidney and liver  Vancomycin   check echocardiogram  Cardiology on consult  ID on consult consider stopping cefepime and Zithromax    Atelectasis unlikely pneumonia/small pleural effusion/nodules right lung   Follow-up CT chest outpatient      Hep C/History polysubstance use/THC use  THC/cocaine cessation    Hyponatremia   Monitor sodium    elevated liver function   Monitor LFT    discussed with significant other at bedside he is not  patient has a daughter with her he indicated her father is next of kin out of state  Discussed with RN  Peptic ulcer disease prophylaxis  DVT prophylaxis  Problem List      Patient Active Problem List   Diagnosis    Septicemia (HCC)    History of MRSA infection    Septic embolism (HCC)    Multifocal pneumonia    Altered mental status    Slurred speech    Infection due to trichomonas    Leukocytosis    Chronic hepatitis C (HCC)    Hematuria       HPI     Amy Wheat is 39 y.o.,  female, previous medical history of polysubstance abuse with hep C and endocarditis mitral and tricuspid 2024 with MRSA septicemia with septic embolization to the lungs liver and kidneys and brain status post vancomycin 
[facility]   Pharmacy Pharmacokinetic Monitoring Service - Vancomycin     Amy Wheat is a 39 y.o. female starting on vancomycin therapy for CAP.   Pharmacy consulted by CHRISTINE Rhoades for monitoring and adjustment. ID consulted.    Target Concentration: Goal AUC/WAYLON 400-600 mg*hr/L    Additional Antimicrobials: cefepime, metronidazole (for UTI)    Pertinent Laboratory Values:   Wt Readings from Last 1 Encounters:   03/30/25 58 kg (127 lb 13.9 oz)     Temp Readings from Last 1 Encounters:   03/30/25 98.4 °F (36.9 °C) (Oral)     Estimated Creatinine Clearance: 69 mL/min (A) (based on SCr of 1 mg/dL (H)).  Recent Labs     03/30/25  1015 03/30/25  1039   CREATININE 1.0*  --    BUN 25*  --    WBC Unable to perform testing: Specimen contaminated. 15.6*     Procalcitonin: none    Pertinent Cultures:  Culture Date Source Results   3/30/25 Blood x2 No Growth     MRSA Nasal Swab: was ordered by provider, awaiting results.    Plan:  Dosing recommendations based on Bayesian software  Vancomycin 2500mg given in ED 3/30/25 @ 1141  Start vancomycin 750mg Q12H tonight @ 0000    Anticipated AUC of 537 and trough concentration of 14.2 at steady state  Vancomycin concentration ordered for 3/31/25 @ 0900   MRSA nasal swab ordered  Pharmacy will continue to monitor patient and adjust therapy as indicated    Thank you for the consult,  Sofia Do [facility]  3/30/2025 4:14 PM   
bacteremia.  Complex endocarditis is noted.     Large mobile vegetation noted on posterior mitral valve leaflet close to 2 cm.     Erosion of the base of the mitral valve leaflet towards the anterior leaflet is noted concerning for abscess formation.  There is a large perforation along the base of the mitral valve annulus with fistulous flow from the left ventricle with the left atrium.  There is also a vegetation   on the anterior leaflet along A2.  See image 14 and 16.     Severe mitral regurgitation is noted.     Aortic valve and pulmonic valve are unremarkable.     Moderate to large tricuspid valve vegetation is also noted with severe tricuspid regurgitation.     Small to moderate pericardial effusion, mostly along right ventricle.  Effusion is fibrinous..  No echocardiographic evidence of tamponade is present.     Stress Test: not obtained.    Cardiac Angiography: not obtained.    Labs:     CBC:   Recent Labs     03/30/25  1039 03/31/25  0303   WBC 15.6* 17.0*   HGB 10.8* 10.4*   HCT 32.2* 32.2*    174     BMP:   Recent Labs     03/30/25  1015 03/31/25  0303   * 135*   K 4.3 4.4   CO2 19* 18*   BUN 25* 29*   CREATININE 1.0* 0.9   LABGLOM 73 80   GLUCOSE 113* 126*     BNP: No results for input(s): \"BNP\" in the last 72 hours.  PT/INR:   Recent Labs     03/30/25  1015   PROTIME 16.3*   INR 1.3     APTT:No results for input(s): \"APTT\" in the last 72 hours.  CARDIAC ENZYMES:No results for input(s): \"CKTOTAL\", \"CKMB\", \"CKMBINDEX\", \"TROPONINI\" in the last 72 hours.  FASTING LIPID PANEL:No results found for: \"HDL\", \"LDLDIRECT\", \"TRIG\"  LIVER PROFILE:  Recent Labs     03/30/25  1015 03/31/25  0303   AST 64* 76*   ALT 37* 40*       IMPRESSION:    Patient Active Problem List   Diagnosis    Septicemia (HCC)    History of MRSA infection    Septic embolism (HCC)    Multifocal pneumonia    Altered mental status    Slurred speech    Infection due to trichomonas    Leukocytosis    Chronic hepatitis C (HCC)

## 2025-04-01 ENCOUNTER — APPOINTMENT (OUTPATIENT)
Dept: MRI IMAGING | Age: 40
DRG: 720 | End: 2025-04-01
Payer: COMMERCIAL

## 2025-04-01 ENCOUNTER — APPOINTMENT (OUTPATIENT)
Dept: GENERAL RADIOLOGY | Age: 40
DRG: 720 | End: 2025-04-01
Payer: COMMERCIAL

## 2025-04-01 VITALS
RESPIRATION RATE: 24 BRPM | HEIGHT: 65 IN | WEIGHT: 124 LBS | BODY MASS INDEX: 20.66 KG/M2 | TEMPERATURE: 99.6 F | OXYGEN SATURATION: 100 % | DIASTOLIC BLOOD PRESSURE: 107 MMHG | HEART RATE: 104 BPM | SYSTOLIC BLOOD PRESSURE: 151 MMHG

## 2025-04-01 PROBLEM — R94.01 ABNORMAL EEG: Status: ACTIVE | Noted: 2025-04-01

## 2025-04-01 LAB
ALBUMIN SERPL-MCNC: 2.4 G/DL (ref 3.5–5.2)
ALBUMIN/GLOB SERPL: 0.6 {RATIO} (ref 1–2.5)
ALP SERPL-CCNC: 334 U/L (ref 35–104)
ALT SERPL-CCNC: 27 U/L (ref 10–35)
ANION GAP SERPL CALCULATED.3IONS-SCNC: 11 MMOL/L (ref 9–16)
AST SERPL-CCNC: 26 U/L (ref 10–35)
BASOPHILS # BLD: <0.03 K/UL (ref 0–0.2)
BASOPHILS NFR BLD: 0 % (ref 0–2)
BILIRUB SERPL-MCNC: 0.4 MG/DL (ref 0–1.2)
BUN SERPL-MCNC: 29 MG/DL (ref 6–20)
CALCIUM SERPL-MCNC: 9.4 MG/DL (ref 8.6–10.4)
CHLORIDE SERPL-SCNC: 109 MMOL/L (ref 98–107)
CO2 SERPL-SCNC: 20 MMOL/L (ref 20–31)
CREAT SERPL-MCNC: 1 MG/DL (ref 0.5–0.9)
EOSINOPHIL # BLD: <0.03 K/UL (ref 0–0.44)
EOSINOPHILS RELATIVE PERCENT: 0 % (ref 1–4)
ERYTHROCYTE [DISTWIDTH] IN BLOOD BY AUTOMATED COUNT: 14.4 % (ref 11.8–14.4)
GFR, ESTIMATED: 74 ML/MIN/1.73M2
GLUCOSE BLD-MCNC: 106 MG/DL (ref 65–105)
GLUCOSE BLD-MCNC: 109 MG/DL (ref 65–105)
GLUCOSE BLD-MCNC: 112 MG/DL (ref 65–105)
GLUCOSE BLD-MCNC: 116 MG/DL (ref 65–105)
GLUCOSE BLD-MCNC: 92 MG/DL (ref 65–105)
GLUCOSE SERPL-MCNC: 118 MG/DL (ref 74–99)
HCT VFR BLD AUTO: 28.2 % (ref 36.3–47.1)
HGB BLD-MCNC: 9.3 G/DL (ref 11.9–15.1)
IMM GRANULOCYTES # BLD AUTO: 0.14 K/UL (ref 0–0.3)
IMM GRANULOCYTES NFR BLD: 1 %
LYMPHOCYTES NFR BLD: 1 K/UL (ref 1.1–3.7)
LYMPHOCYTES RELATIVE PERCENT: 7 % (ref 24–43)
MCH RBC QN AUTO: 31.1 PG (ref 25.2–33.5)
MCHC RBC AUTO-ENTMCNC: 33 G/DL (ref 28.4–34.8)
MCV RBC AUTO: 94.3 FL (ref 82.6–102.9)
MICROORGANISM SPEC CULT: ABNORMAL
MICROORGANISM/AGENT SPEC: ABNORMAL
MICROORGANISM/AGENT SPEC: ABNORMAL
MONOCYTES NFR BLD: 0.94 K/UL (ref 0.1–1.2)
MONOCYTES NFR BLD: 6 % (ref 3–12)
NEUTROPHILS NFR BLD: 86 % (ref 36–65)
NEUTS SEG NFR BLD: 12.6 K/UL (ref 1.5–8.1)
NRBC BLD-RTO: 0 PER 100 WBC
PLATELET # BLD AUTO: 189 K/UL (ref 138–453)
PMV BLD AUTO: 10.8 FL (ref 8.1–13.5)
POTASSIUM SERPL-SCNC: 3.8 MMOL/L (ref 3.7–5.3)
PROT SERPL-MCNC: 6.3 G/DL (ref 6.6–8.7)
RBC # BLD AUTO: 2.99 M/UL (ref 3.95–5.11)
SERVICE CMNT-IMP: ABNORMAL
SODIUM SERPL-SCNC: 139 MMOL/L (ref 136–145)
SPECIMEN DESCRIPTION: ABNORMAL
VANCOMYCIN SERPL-MCNC: 25.9 UG/ML (ref 5–40)
VANCOMYCIN SERPL-MCNC: 38.4 UG/ML (ref 5–40)
WBC OTHER # BLD: 14.7 K/UL (ref 3.5–11.3)

## 2025-04-01 PROCEDURE — 36415 COLL VENOUS BLD VENIPUNCTURE: CPT

## 2025-04-01 PROCEDURE — 2580000003 HC RX 258: Performed by: PSYCHIATRY & NEUROLOGY

## 2025-04-01 PROCEDURE — 70552 MRI BRAIN STEM W/DYE: CPT

## 2025-04-01 PROCEDURE — 2500000003 HC RX 250 WO HCPCS

## 2025-04-01 PROCEDURE — 2580000003 HC RX 258

## 2025-04-01 PROCEDURE — 6360000004 HC RX CONTRAST MEDICATION: Performed by: PSYCHIATRY & NEUROLOGY

## 2025-04-01 PROCEDURE — 74018 RADEX ABDOMEN 1 VIEW: CPT

## 2025-04-01 PROCEDURE — 82947 ASSAY GLUCOSE BLOOD QUANT: CPT

## 2025-04-01 PROCEDURE — A9579 GAD-BASE MR CONTRAST NOS,1ML: HCPCS | Performed by: PSYCHIATRY & NEUROLOGY

## 2025-04-01 PROCEDURE — 6360000002 HC RX W HCPCS: Performed by: PSYCHIATRY & NEUROLOGY

## 2025-04-01 PROCEDURE — 2700000000 HC OXYGEN THERAPY PER DAY

## 2025-04-01 PROCEDURE — 95816 EEG AWAKE AND DROWSY: CPT

## 2025-04-01 PROCEDURE — 2500000003 HC RX 250 WO HCPCS: Performed by: EMERGENCY MEDICINE

## 2025-04-01 PROCEDURE — 94761 N-INVAS EAR/PLS OXIMETRY MLT: CPT

## 2025-04-01 PROCEDURE — C9254 INJECTION, LACOSAMIDE: HCPCS | Performed by: PSYCHIATRY & NEUROLOGY

## 2025-04-01 PROCEDURE — 99233 SBSQ HOSP IP/OBS HIGH 50: CPT | Performed by: PSYCHIATRY & NEUROLOGY

## 2025-04-01 PROCEDURE — 80053 COMPREHEN METABOLIC PANEL: CPT

## 2025-04-01 PROCEDURE — 95718 EEG PHYS/QHP 2-12 HR W/VEEG: CPT | Performed by: PSYCHIATRY & NEUROLOGY

## 2025-04-01 PROCEDURE — 85025 COMPLETE CBC W/AUTO DIFF WBC: CPT

## 2025-04-01 PROCEDURE — 95711 VEEG 2-12 HR UNMONITORED: CPT

## 2025-04-01 PROCEDURE — 99233 SBSQ HOSP IP/OBS HIGH 50: CPT | Performed by: NURSE PRACTITIONER

## 2025-04-01 PROCEDURE — 80202 ASSAY OF VANCOMYCIN: CPT

## 2025-04-01 PROCEDURE — 99233 SBSQ HOSP IP/OBS HIGH 50: CPT | Performed by: INTERNAL MEDICINE

## 2025-04-01 PROCEDURE — 2580000003 HC RX 258: Performed by: INTERNAL MEDICINE

## 2025-04-01 PROCEDURE — 2500000003 HC RX 250 WO HCPCS: Performed by: NURSE PRACTITIONER

## 2025-04-01 PROCEDURE — 6360000002 HC RX W HCPCS

## 2025-04-01 PROCEDURE — 99233 SBSQ HOSP IP/OBS HIGH 50: CPT | Performed by: STUDENT IN AN ORGANIZED HEALTH CARE EDUCATION/TRAINING PROGRAM

## 2025-04-01 PROCEDURE — 95819 EEG AWAKE AND ASLEEP: CPT | Performed by: PSYCHIATRY & NEUROLOGY

## 2025-04-01 RX ORDER — METOPROLOL TARTRATE 1 MG/ML
5 INJECTION, SOLUTION INTRAVENOUS EVERY 6 HOURS
Status: DISCONTINUED | OUTPATIENT
Start: 2025-04-01 | End: 2025-04-02 | Stop reason: HOSPADM

## 2025-04-01 RX ORDER — LEVETIRACETAM 500 MG/5ML
1000 INJECTION, SOLUTION, CONCENTRATE INTRAVENOUS EVERY 12 HOURS
Status: DISCONTINUED | OUTPATIENT
Start: 2025-04-01 | End: 2025-04-02 | Stop reason: HOSPADM

## 2025-04-01 RX ADMIN — GADOTERIDOL 10 ML: 279.3 INJECTION, SOLUTION INTRAVENOUS at 15:38

## 2025-04-01 RX ADMIN — LACOSAMIDE 200 MG: 10 INJECTION INTRAVENOUS at 18:24

## 2025-04-01 RX ADMIN — VANCOMYCIN HYDROCHLORIDE 1000 MG: 1 INJECTION, POWDER, LYOPHILIZED, FOR SOLUTION INTRAVENOUS at 03:00

## 2025-04-01 RX ADMIN — METOPROLOL TARTRATE 5 MG: 5 INJECTION INTRAVENOUS at 18:19

## 2025-04-01 RX ADMIN — SODIUM CHLORIDE, PRESERVATIVE FREE 10 ML: 5 INJECTION INTRAVENOUS at 19:33

## 2025-04-01 RX ADMIN — LEVETIRACETAM 1000 MG: 100 INJECTION INTRAVENOUS at 18:20

## 2025-04-01 RX ADMIN — LEVETIRACETAM 500 MG: 100 INJECTION INTRAVENOUS at 02:51

## 2025-04-01 RX ADMIN — SODIUM CHLORIDE, PRESERVATIVE FREE 10 ML: 5 INJECTION INTRAVENOUS at 08:09

## 2025-04-01 RX ADMIN — DEXTROSE AND SODIUM CHLORIDE: 5; .9 INJECTION, SOLUTION INTRAVENOUS at 03:02

## 2025-04-01 RX ADMIN — METOPROLOL TARTRATE 5 MG: 5 INJECTION INTRAVENOUS at 14:00

## 2025-04-01 ASSESSMENT — PAIN SCALES - GENERAL
PAINLEVEL_OUTOF10: 0

## 2025-04-01 NOTE — PROCEDURES
[place] [id number]                       ELECTROENCEPHALOGRAM REPORT      PATIENT NAME: DANDRE DOWD             : 1985  MED REC NO: [id number]                         ROOM: 1114  ACCOUNT NO: [id number]                       ADMIT DATE: 2025  PROVIDER: Stephen Osorio MD      DATE OF SERVICE:  2025    INDICATION:  This is a 39-year-old lady with acute encephalopathy, with multiple cerebral septic emboli, rule out nonconvulsive seizure activity.    MEDICATIONS:  Include Klonopin, Catapres, doxepin, Cymbalta, Neurontin, Keppra, Lopressor.    INTERPRETATION:  This is an 18-channel EEG with 1 EKG channel recording performed in a patient who is described to be with altered mentation.  The patient shows delayed rhythms.  The patient has generalized slow biphasic and triphasic wave discharge seen throughout the recording.    Background rhythm:  There is a moderate degree of medium amplitude 4 to 7 hertz activity admixed with 2 to 3 hertz activity with the same amplitude range.  Hyperventilation is not performed.  Photic stimulation shows no change of the record.    IMPRESSION:  This EEG shows the presence of generalized biphasic to triphasic wave discharge suggestive of nonconvulsive seizure activity.  Clinical correlation is warranted.          STEPHEN OSORIO MD      D:  2025 16:23:09     T:  2025 17:07:34     ETC/AQS  Job #:  [id number]     Doc#:  [id number]

## 2025-04-01 NOTE — PROCEDURES
EEG generalized biphasic and triphasic wave discharge suggestive of nonconvulsive seizure activity

## 2025-04-01 NOTE — CARE COORDINATION
DC Planning    Spoke with pt nurse, pt will need continuous EEG will  need to transfer to [facility].   
Social work referred for possible placement. Will follow. Laura mcintyre  
Social work:  chart reviewed, noted patient recently at [facility].   Called [facility], spoke to Zia, informed patient was at facility 1/22-3/6 dc'd to a friends house.     
No  Would you like Case Management to discuss the discharge plan with any other family members/significant others, and if so, who? No  Plans to Return to Present Housing: Unknown at present  Other Identified Issues/Barriers to RETURNING to current housing: homeless, medical condition  Potential Assistance needed at discharge: Skilled Nursing Facility, Transportation, Extended Care Facility            Potential DME:    Patient expects to discharge to: Unknown  Plan for transportation at discharge: Friends    Financial    Pay[place]: [place] / Plan: [place] / Product Type: *No Product type* /     Does insurance require precert for SNF: Yes    Potential assistance Purchasing Medications: No  Meds-to-Beds request:        [id number] - [place], [place] - [address] - P [phone] - F [phone] [address] [place] [address]  Phone: [phone] Fax: [phone]      Notes:    Factors facilitating achievement of predicted outcomes: n/a    Barriers to discharge: No family support, Cognitive deficit, Limited insight into deficits, Unrealistic expectations, Decreased endurance, Medical complications, and Medication managment    Additional Case Management Notes: Septicemia, AMS, pneumonia.Patient is homeless. Lived with a friend Trace but Trace cannot care for the pt. Anymore. Has Father that lives in [place]. (POA). Hx of stroke in Dec. Hx of Polysubstance abuse. Right sided weakness and aphasia. Has [facility] to [facility]. Neuro, cards, ID consulted. Most of patient's docs are at [facility]. Supposed  to have a procedure there on 4/8. Plan [facility] today. Probable SNF placement.     The Plan for Transition of Care is related to the following treatment goals of Septicemia (HCC) [A41.9]  Altered mental status, unspecified altered mental status type [R41.82]  Pneumonia due to infectious organism, unspecified laterality, unspecified part of lung [J18.9]    IF APPLICABLE: The Patient

## 2025-04-01 NOTE — DISCHARGE SUMMARY
on discharge is  15 mins in patient examination, evaluation, counseling as well as medication reconciliation, prescriptions for required medications, discharge plan and follow up.    Electronically signed by   Thomas Ceballos MD  4/1/2025  6:48 PM      Thank you Dr. Haywood primary care provider on file. for the opportunity to be involved in this patient's care.

## 2025-04-02 NOTE — PROGRESS NOTES
NEUROLOGY INPATIENT PROGRESS NOTE- ADDENDUM    4/1/2025             EEG done this afternoon, report reviewed and it was significantly abnormal indicating nonconvulsive status.  Called and spoke to patient's nurse.  Will start her on IV Vimpat 200 mg now followed by 100 mg twice daily and also to optimize the dose of Keppra from 500 mg bid to 1000 mg bid.  Also to get continuous EEG monitoring from this evening onwards.      Nicole Odell MD 4/1/2025 4:46 PM       Called and spoke to EEG technologist at [facility] and they would be able to let EEG running this evening onwards until am.    Dr Meyers would be able to read that continuous eeg monitoring.     Will decide in am about further care plan.     Nicole Odell MD 4/1/2025 4:59 PM         
  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    Impression :   MRSA sepsis 3/30/2025  known history of MRSA tricuspid valve/mitral valve endocarditis September 2024 with prior history of septic emboli  2D echocardiogram with severe mitral valve regurgitation and small vegetation that is mobile on the posterior leaflet, severe tricuspid valve regurgitation with mild pulmonary hypertension  Aortic valve endocarditis  Altered mental status secondary to multiple small areas of acute infarct scattered throughout the brain consistent with embolic phenomena  Known history of right SC joint MRSA osteomyelitis/abscess  status post prior drainage procedure September 2024  open wound at the previous surgical site with MRSA noted consistent with recurrent infection  Bilateral pleural effusions  Trichomonas infection  Hepatitis C virus infection-has abnormal LFT  Anemia    Recommendations:   Continue intravenous antimicrobial therapy with vancomycin  The patient has persistent/recurrent endocarditis with embolic phenomena and has septic pulmonary emboli as well as multiple acute infarcts in the brain  The patient was not previously a surgical candidate and is not likely a surgical candidate at this time  Mentation remains very poor  We will continue systemic antibiotic therapy    Infection Control Recommendations:   Contact precautions    Discharge Planning:   Estimated Length of IV antimicrobials: 6 weeks  Patient will need Midline Catheter Insertion/ PICC line Insertion: No  Patient will need: Home IV , Infusion Center,  SNF,  LTAC: Undetermined  Patient willneed outpatient wound care: No    Medical Decision making / Summary of Stay:   Amy Wheat is a 39 y.o.-year-old female who was initially admitted on 3/30/2025.      Amy has previously been followed by the infectious 
  NEUROLOGY INPATIENT PROGRESS NOTE    4/1/2025         Amy Wheat is a  39 y.o. female admitted on 3/30/2025 with  Septicemia ([facility]) [A41.9]  Altered mental status, unspecified altered mental status type [R41.82]  Pneumonia due to infectious organism, unspecified laterality, unspecified part of lung [J18.9]    Reason for consult: Altered mentation  Most of the history is obtained from patient's medical record as patient is unable to provide any history.  Chart is reviewed and patient is examined.   Briefly, this is a  39 y.o. female admitted on 3/30/2025 with altered mentation.   Chief complaint: Patient presents to ED from home for slurred speech and confusion.    Per EMS, family reports this \"started today but did not notice yesterday.  Reports stroke 7 months ago and discharged from nursing facility 3 weeks ago.  States only deficits from stroke or slurred speech, but more pronounced today\".  Patient initially presented to ER with concerns of altered mentation and slurred speech.   History includes hosp in 2024 for MRSA septicemia complicated by septic embolization of lungs, liver, brain and kidneys.  She was on mechanical vent at that time.  She was noted to have endocarditis of mitral and tricuspid valves on echo in 2024.  Urine tox(3/30/25): +ve for cocaine, cannabinoids.  X-ray chest 3/30/2025: Diffuse hazy bilateral airspace opacities representing multi lobar pneumonia.  CT head 3/30/2025: No acute intracranial abnormality.  Since admitted, patient remained unresponsive but noted to have spontaneous movements of all extremities.  As per medical record; patient has a longstanding history of drug abuse with septic emboli involving lungs and liver as stated above.  She also had mitral and tricuspid valve endocarditis with concern for perivalvular mitral abscesses.  Patient had large mobile vegetations on mitral valve and at times are being made to transfer the patient to [facility] or [facility].   Patient 
[facility]  Office: [phone]  Edmar Luong DO, Johnny Allison DO, Beny Hogan DO, Jayro Delgadillo DO, Pablito Driscoll MD, Azra Ward MD, Wilman Nathan MD, Haylie Frazier MD,  Nitesh Rubin MD, Suma Ramos MD, Thomas Ceballos MD,  Jovita Ryan DO, Niru Rodriguez MD, Harshal Lane MD, Micha Luong DO, Elicia Hermosillo MD,  Nigel Reaves DO, Melissa Myaa MD, Jaye Simpson MD, Kb Fuentes MD,  Ernie Cervantes MD, Fiordaliza Faust MD, Marilyn Zambrano MD, Gisela Ramirez MD, Juan Carlos Pacheco MD, Kasey Yee MD, Renzo Sandoval DO, Familia Arroyo MD, Jovita Reynoso MD, Mohsin Reza, MD, Noa Boston MD, Shirley Waterhouse, CNP,  Geraldine Mejia, CNP, Renzo Washburn, CNP,  Laura Perez, DNP, Kalian Mercado, CNP, Samantha Noland, CNP, Rubi Cuevas, CNP, Natalya Faye, CNP, Karli Gabriel, PA-C, Libia Ochoa, CNP, Vivian Mccarty, CNP,  Ileana Rodriguez, CNP, Aida Cox, CNP, Luis Felix, PA-C, Alda Timmons, CNP,  Pat Oreilly, CNS, Rupa Zhao, CNP, Toshia Pablo, CNP,   Violeta Bennett, CNP         [facility]   IN-PATIENT SERVICE   [facility]    Progress Note    3/31/2025    3:20 PM    Name:   Amy Wheat  MRN:     [id number]     Acct:      [id number]   Room:   [facility]/1114-[facility] Day:  1  Admit Date:  3/30/2025  9:45 AM    PCP:   No primary care provider on file.  Code Status:  Full Code    Subjective:     C/C:   Chief Complaint   Patient presents with    Aphasia     Pt presents to ED from home for slurred speech and confusion. Per EMS family reports this started today but did not notice yesterday. Reports stroke 7 months ago and discharged from nursing facility 3wks ago. States only deficits from stroke are slurred speech, but more pronounced today.    Wound Check     Interval History Status: .   Remains nonverbal  Blood pressure from 141/98, heart rate 110, saturating 94% on room air, afebrile  Blood cultures growing gram-positive cocci in 
At 2245 EMS picked up patient and belonging  At 2253 attempted to call report to [facility]    
[facility]   Pharmacy Pharmacokinetic Monitoring Service - Vancomycin    Consulting Provider: CHRISTINE Rhoades (ID consulted)  Indication: CAP  Target Concentration: Goal AUC/WAYLON 400-600 mg*hr/L  Day of Therapy: 2  Additional Antimicrobials: cefepime & metronidazole    Pertinent Laboratory Values:   Wt Readings from Last 1 Encounters:   03/30/25 56.5 kg (124 lb 9 oz)     Temp Readings from Last 1 Encounters:   03/31/25 98.3 °F (36.8 °C) (Axillary)     Estimated Creatinine Clearance: 75 mL/min (based on SCr of 0.9 mg/dL).  Recent Labs     03/30/25  1015 03/30/25  1039 03/31/25  0303   CREATININE 1.0*  --  0.9   BUN 25*  --  29*   WBC Unable to perform testing: Specimen contaminated. 15.6* 17.0*     Procalcitonin: none    Pertinent Cultures:      MRSA Nasal Swab: showed MRSA positive result on 3/30/25    Recent vancomycin administrations                     vancomycin (VANCOCIN) 750 mg in sodium chloride 0.9 % 250 mL IVPB (mg) 750 mg New Bag 03/31/25 0028    vancomycin (VANCOCIN) 1500 mg in sodium chloride 0.9 % 250 mL IVPB (mg) 1,500 mg New Bag 03/30/25 1141          Assessment:  Date/Time Current Dose Concentration Timing of Concentration (h) AUC   3/31/25 @ 0918 750mg Q12H 5.6 9 272   Note: Serum concentrations collected for AUC dosing may appear elevated if collected in close proximity to the dose administered, this is not necessarily an indication of toxicity    Plan:  Current dosing regimen is sub-therapeutic  Increase dose to 1000mg Q8H (anticipated AUC of 542 and trough of 14.2 at steady state)  Repeat vancomycin concentration ordered for 4/1/25 @ 0900   Pharmacy will continue to monitor patient and adjust therapy as indicated    Thank you for the consult,  Sofia Do RPH  3/31/2025 9:46 AM   
[facility]   Pharmacy Pharmacokinetic Monitoring Service - Vancomycin    Consulting Provider: CHRISTINE Rhoades (ID following)   Indication: CAP  Target Concentration: Goal AUC/WAYLON 400-600 mg*hr/L  Day of Therapy: 3  Additional Antimicrobials: None    Pertinent Laboratory Values:   Wt Readings from Last 1 Encounters:   03/31/25 56.2 kg (124 lb)     Temp Readings from Last 1 Encounters:   04/01/25 99 °F (37.2 °C) (Axillary)     Estimated Creatinine Clearance: 74 mL/min (based on SCr of 0.9 mg/dL).  Recent Labs     03/30/25  1015 03/30/25  1039 03/31/25  0303 04/01/25  0917   CREATININE 1.0*  --  0.9  --    BUN 25*  --  29*  --    WBC Unable to perform testing: Specimen contaminated.   < > 17.0* 14.7*    < > = values in this interval not displayed.     Procalcitonin: None    Pertinent Cultures:  Procedure Component Value Units Date/Time   Culture, Wound (with Gram Stain) [[id number]] (Abnormal)  Collected: 03/30/25 1615   Order Status: Completed Specimen: Wound from Chest Updated: 04/01/25 0740    Specimen Description .CHEST    Special Requests Site: Wound    Direct Exam FEW NEUTROPHILS     FEW GRAM POSITIVE COCCI IN CLUSTERS    Culture METHICILLIN RESISTANT STAPHYLOCOCCUS AUREUS MODERATE GROWTH Abnormal    Culture, Blood 2 [[id number]] (Abnormal) Collected: 03/30/25 1000   Order Status: Completed Specimen: Blood Updated: 03/31/25 1618    Specimen Description .BLOOD    Special Requests RT HAND,10ML    Culture POSITIVE Blood Culture Abnormal      DIRECT GRAM STAIN FROM BOTTLE: GRAM POSITIVE COCCI IN CLUSTERS     STAPHYLOCOCCUS AUREUS Abnormal      (NOTE) Direct Gram Stain from bottle result called to and read back by:PANCHITO COSTA 3/30/25 2210   Culture, Blood 1 [[id number]] (Abnormal) Collected: 03/30/25 1013   Order Status: Completed Specimen: Blood Updated: 03/31/25 1612    Specimen Description .BLOOD    Special Requests LT HAND,12ML    Culture POSITIVE Blood Culture Abnormal      DIRECT GRAM STAIN FROM 
Bed available at [facility].  Patient to be transported tonight.   
Called report to [facility] Answered all Questions.   
End Of Shift Note  [place] ICU  Summary of shift: At start of shift patient received 1x dose of 1g Keppra. Patient had CTA Head and Neck done; patient tolerated well. Updated Dr. Odell on results of CTA; no new orders given. Patient responded to pain; no commands. Placed new dressing over patient chest wound. No BM, 2,275 ml U/O. Plan for EEG today.     Vitals:    Vitals:    04/01/25 0407 04/01/25 0408 04/01/25 0500 04/01/25 0726   BP: (!) 156/103  (!) 153/97    Pulse: (!) 107  (!) 106    Resp: 24  27    Temp: 99.2 °F (37.3 °C) 99.5 °F (37.5 °C)  99.1 °F (37.3 °C)   TempSrc: Oral Axillary  Axillary   SpO2: 97%      Weight:       Height:            I&O:   Intake/Output Summary (Last 24 hours) at 4/1/2025 0730  Last data filed at 4/1/2025 0634  Gross per 24 hour   Intake 1406.19 ml   Output 2275 ml   Net -868.81 ml       Resp Status: Room air    Ventilator Settings:     / / /     Critical Care IV infusions:   dextrose 5 % and 0.9 % NaCl 75 mL/hr at 04/01/25 0302    sodium chloride      sodium chloride 10 mL/hr at 03/31/25 1817        LDA:   Peripheral IV 03/30/25 Distal;Posterior;Right Forearm (Active)   Number of days: 1       Peripheral IV 03/30/25 Left;Posterior Hand (Active)   Number of days: 1       Urinary Catheter 03/30/25 (Active)   Number of days: 2       Wound 03/30/25 Chest Right;Upper (Active)   Number of days: 1         
End Of Shift Note  [facility] [place] ICU  Summary of shift: Pt non-verbal, globally aphasic, and will only grunt/moan with movement. Pt does have non-purposeful movement and does not respond to commands. CC and neuro given update on at start of shift. CC notified of CTC results and current assessment ABG and ammonia ordered with no new orders after results. Neuro gave BP parameters for SBP <200 permissive HTN. Pt tachycardic 110-120 at start of shift- 5 mg lopressor ordered Q6 for HR sustained >110 and given at 2024. Dressing on R clavicle changed and photos taken with unit phone for wound care references. Bath given and tolerated fairly well- Q2 turns. Mepilex applied to coccyx d/t redness. Scattered scarring and needle marks in all extremities. Writer spoke with pts father and completed majority of MRI screening- see prev note. 550 u/o from [facility]. NS cont at 150mL/hr. NVPS 1-4.     Vitals:    Vitals:    03/31/25 0300 03/31/25 0400 03/31/25 0500 03/31/25 0600   BP: (!) 124/95 126/85 (!) 129/92 (!) 141/98   Pulse: 99 98 (!) 109 (!) 110   Resp: 21 20 24 21   Temp:       TempSrc:       SpO2: 95% 96% 96% 97%   Weight:       Height:            I&O:   Intake/Output Summary (Last 24 hours) at 3/31/2025 0629  Last data filed at 3/31/2025 0000  Gross per 24 hour   Intake 3254.91 ml   Output 650 ml   Net 2604.91 ml       Resp Status: RA    Ventilator Settings:     / / /     Critical Care IV infusions:   sodium chloride      sodium chloride      sodium chloride 150 mL/hr at 03/30/25 2343        LDA:   Peripheral IV 03/30/25 Distal;Posterior;Right Forearm (Active)   Number of days: 0       Peripheral IV 03/30/25 Left;Posterior Hand (Active)   Number of days: 0       Urinary Catheter 03/30/25 (Active)   Number of days: 1       Wound 03/30/25 Chest Right;Upper (Active)   Number of days: 0          
End Of Shift Note  [facility] ICU  Summary of shift: Pt remains non-verbal, globally aphasic, only withdrawing from pain and will grunt/moan when care being done. Dr. Plasencia stopped 0.9%NaCl fluids and started D5%+0.9%NaCl @ 75mL/hr d/t pt being strict NPO. HR sustaining 100-120 and SBP maintaining below 200 per neurology's order; 5mg lopressor given @ 1350. ID tore down dressing on right clavicle; ATB adjusted. MRI completed this shift; see results/neurology notes. Lovenox held per neurology d/t multiple embolic infarcts. Dr. Odell saw pt end of shift; EEG, CTA head & neck w/contrast and Keppra ordered.     Vitals:    Vitals:    03/31/25 1935 03/31/25 1945 03/31/25 2000 03/31/25 2015   BP:   (!) 157/104    Pulse: (!) 116 (!) 110 (!) 112 (!) 111   Resp: 27 27 27 26   Temp:   98.6 °F (37 °C)    TempSrc:   Axillary    SpO2: 96% 96% 96% 96%   Weight:       Height:            I&O:   Intake/Output Summary (Last 24 hours) at 3/31/2025 2116  Last data filed at 3/31/2025 1935  Gross per 24 hour   Intake 3601.54 ml   Output 1550 ml   Net 2051.54 ml       Resp Status: room air        Critical Care IV infusions:   dextrose 5 % and 0.9 % NaCl 75 mL/hr at 03/31/25 1817    sodium chloride      sodium chloride 10 mL/hr at 03/31/25 1817        LDA:   Peripheral IV 03/30/25 Distal;Posterior;Right Forearm (Active)   Number of days: 1       Peripheral IV 03/30/25 Left;Posterior Hand (Active)   Number of days: 1       Urinary Catheter 03/30/25 (Active)   Number of days: 1       Wound 03/30/25 Chest Right;Upper (Active)   Number of days: 1          
End Of Shift Note  [place] ICU    Summary of shift: Pt admitted to ICU for sepsis, AMS, and bilateral pneumonia. Pt has global aphasia. She is able to open eyes to voice, unable to follow commands. Hx of left MCA stroke 7 months ago and discharged to a facility with slurred speech deficit. She was recently discharged from facility 3 weeks ago. Patient presented with a quarter sized open wound to her chest, below and to the right of previous trach site, that was cultured and dry dressing applied. Unable to obtain much history due to aphasia.      Vitals:    Vitals:    03/30/25 1330 03/30/25 1545 03/30/25 1600 03/30/25 1700   BP:   (!) 133/99 (!) 137/100   Pulse:   (!) 126 (!) 127   Resp:   22 23   Temp: 98.4 °F (36.9 °C)  98.7 °F (37.1 °C)    TempSrc: Oral  Axillary    SpO2:   95% 95%   Weight:       Height:  1.676 m (5' 5.98\")          I&O:   Intake/Output Summary (Last 24 hours) at 3/30/2025 1803  Last data filed at 3/30/2025 1803  Gross per 24 hour   Intake 2441.9 ml   Output 400 ml   Net 2041.9 ml       Resp Status: Room air      Critical Care IV infusions:   sodium chloride      sodium chloride      sodium chloride 150 mL/hr at 03/30/25 1803        LDA:   Peripheral IV 03/30/25 Distal;Posterior;Right Forearm (Active)   Number of days: 0       Peripheral IV 03/30/25 Left;Posterior Hand (Active)   Number of days: 0       Urinary Catheter 03/30/25 (Active)   Number of days: 0       Wound 03/30/25 Chest Right;Upper (Active)   Number of days: 0         
[facility]  Office: [phone]  Edmar Luong DO, Johnny Allison DO, Beny Hogan DO, Jayro Delgadillo DO, Pablito Driscoll MD, Azra Ward MD, Wilman Nathan MD, Haylie Frazier MD,  Nitesh Rubin MD, Suma Ramos MD, Thomas Ceballos MD,  Jovita Ryan DO, Niru Rodriguez MD, Harshal Lane MD, Micha Luong DO, Elicia Hermosillo MD,  Nigel Reaves DO, Melissa Maya MD, Jaye Simpson MD, Kb Fuentes MD,  Ernie Cervantes MD, Fiordaliza Faust MD, Marilyn Zambrano MD, Gisela Ramirez MD, Juan Carlos Pacheco MD, Kasey Yee MD, Renzo Sandoval DO, Familia Arroyo MD, Jovita Reynoso MD, Mohsin Reza, MD, Noa Boston MD, Shirley Waterhouse, CNP,  Geraldine Mejia, CNP, Renzo Washburn, CNP,  Laura Perez, DNP, Kalina Mercado, CNP, Samantha Noland, CNP, Rubi Cuevas, CNP, Natalya Faye, CNP, Karli Gabriel, PA-C, Libia Ochoa, CNP, Vivian Mccarty, CNP,  Ileana Rodriguez, CNP, Aida Cox, CNP, Luis Felix, PA-C, Alda Timmons, CNP,  Pat Oreilly, CNS, Rupa Zhao, CNP, Toshia Pablo, CNP,   Violeta Bennett, CNP         [facility]   IN-PATIENT SERVICE   [facility]    Progress Note    4/1/2025    12:11 PM    Name:   Amy Wheat  MRN:     [id number]     Acct:      [id number]   Room[facility]   [facility] Day:  2  Admit Date:  3/30/2025  9:45 AM    PCP:   No primary care provider on file.  Code Status:  Full Code    Subjective:     C/C:   Chief Complaint   Patient presents with    Aphasia     Pt presents to ED from home for slurred speech and confusion. Per EMS family reports this started today but did not notice yesterday. Reports stroke 7 months ago and discharged from nursing facility 3wks ago. States only deficits from stroke are slurred speech, but more pronounced today.    Wound Check     Interval History Status: not changed.     Patient seen examined at bedside.  Still obtunded.  Seems to grimace at pain when sternal rubbing.  Does not track, not following commands 
Ngt placed to left nares at 60cm.  Air bolus auscultated to right upper mid abdominal area.  Order placed for abdominal xray for placement.   
Notified Neuro that we do not do continuous EEG monitoring, patient will need transfer to [facility].   
Patient admitted to room 1114. She is unable to follow simple commands or verbally respond. She has scattered scarring throughout her arms. She has a wound on her chest that is below a previous trach site, that has been assess by the internal medicine PA and it has been cultured. Patient is tachycardic but other vitals are stable.   
Patient will go to [facility] [address] [facility] [address]    Bed placement: J51 room 3  Report #  [phone]  Admitted under Dr. Richard   
Pharmacy Medication Review    The patient's list of current home medications has been reviewed. Unable to interview pt at this time (pt nonverbal)    Source(s) of information: Surescripts refill report, 3/12/25 med list from recent SNF ([facility]), YUD[facility]      Other Notes She gets  Gabapentin 300mg TID from her outpatient pharmacy but she was previously on 400mg TID while at [facility] facility  She gets Doxepin 10mg nightly from her outpatient pharmacy but she was previously getting 3mg nightly while at [facility]  Per [facility] med list she is supposed to be on both Vitamin D 50mcg daily + weekly dose until 4/27 when the daily dose was scheduled to stop. Unable to to confirm at this time if she was taking both formulations prior to this admission.           Please feel free to call me with any questions about this encounter. Thank you.    Roseann Nichols [facility]   Phone: [phone]      Electronically signed by Roseann Nichols [facility] on 3/31/2025 at 3:41 PM           Prior to Admission medications    Medication Sig   Cholecalciferol (VITAMIN D3) 50 MCG (2000 UT) TABS Take 1 tablet by mouth daily   vitamin D (ERGOCALCIFEROL) 1.25 MG (67966 UT) CAPS capsule Take 1 capsule by mouth once a week Indications: Thursdays   clonazePAM (KLONOPIN) 0.5 MG tablet Take 1 tablet by mouth 3 times daily as needed for Anxiety.   cloNIDine (CATAPRES) 0.1 MG tablet Take 1 tablet by mouth 2 times daily   DULoxetine (CYMBALTA) 60 MG extended release capsule Take 1 capsule by mouth daily   furosemide (LASIX) 20 MG tablet Take 1 tablet by mouth every other day   gabapentin (NEURONTIN) 300 MG capsule Take 1 capsule by mouth 3 times daily.   spironolactone (ALDACTONE) 25 MG tablet Take 1 tablet by mouth daily   doxepin (SINEQUAN) 10 MG capsule Take 1 capsule by mouth nightly      
Pulmonary Critical Care Progress Note    Patient seen for the follow up of Septicemia (HCC)     Subjective:    Still obtunded does not follow command moves left side.  She had MRI of the brain done.  NG inserted.  She has stable blood pressure.  Echocardiogram was done.    Examination:    Vitals: BP (!) 152/95   Pulse (!) 108   Temp 98.4 °F (36.9 °C) (Axillary)   Resp 23   Ht 1.651 m (5' 5\")   Wt 56.2 kg (124 lb)   SpO2 98%   BMI 20.63 kg/m²   SpO2  Av.7 %  Min: 94 %  Max: 100 %  General appearance: Obtunded does not follow command  Neck: No JVD tunneling right supraclavicular wound with pulse with trach scar noted  Lungs: Decreased breath sound no crackles or wheeze  Heart: regular rate and rhythm, S1, S2 normal, no gallop  Abdomen: Soft, non tender, + BS  Extremities: no cyanosis or clubbing. No significant edema    LABs:    CBC:   Recent Labs     253 25  0917   WBC 17.0* 14.7*   HGB 10.4* 9.3*   HCT 32.2* 28.2*    189     BMP:   Recent Labs     253 25  0917   * 139   K 4.4 3.8   CO2 18* 20   BUN 29* 29*   CREATININE 0.9 1.0*   LABGLOM 80 74   GLUCOSE 126* 118*     PT/INR:   Recent Labs     25  1015   PROTIME 16.3*   INR 1.3     APTT:No results for input(s): \"APTT\" in the last 72 hours.  LIVER PROFILE:  Recent Labs     25  0303 25  0917   AST 76* 26   ALT 40* 27       Radiology:  CTA head neck    1. Chronic occlusion of the lateral aspect of the M1 segment of the left MCA.  2. Distal occlusion of several A3/A4, M3/M4 branches in the bilateral  anterior cerebral and bilateral middle cerebral artery territories.  3. No significant stenosis of the cervical carotid or vertebral arteries.  4. Small bilateral pleural effusions.  5. Patchy airspace opacities in both lungs suggesting septic emboli.     Echocardiogram 3/30  Left Ventricle: Normal left ventricular systolic function. EF by visual approximation is 58%. Left ventricle size is normal. 
Spoke with EEG tech, they are able to run EEG on her all night.    
Spoke with Room mate Trace, answered all question. He would also like to be updated when patient arrived to [facility].  
Unable to obtain admission history as patient is aphasic.   
Writer reached out to patients contact, Oliver Wheat, who is the father of pt. Updates given regarding current state and medical treatment. Writer informed by father (Oliver) that pt is not legally . Pts mother, Laura Wheat, is in nursing home and unable to help make decisions for pt per Oliver. Pt does have 1 child, Zara, who is 16 years old. Pt does have a significant other (father of Zara) named Jeff Scott. Pt also has brother, Joon Wheat. Pts father did request to add Jeff, Jarret, and Zara to chart contacts so they may call and get information. Pts father stated that he had been estranged from pt until this past September when she was admitted at [facility]. He did state a hx of receiving dialysis but was unsure if the treatments were via fistula or tunneled catheter. He did state that pt has hx of drug abuse stating, \"She gets any drug she can get her hands on.\" Writer was informed that pt had not had any previous brain surgery/cardiac surgery but did state that she was scheduled for a cardiac surgery with [facility] on April 8th- he is unsure of what type of surgery. MRI screening form completed with as much information that Oliver was able to provide. He states he lives in [place] and was planning on visiting for pts birthday in April.     Oliver given unit phone number and made aware/agreeable to making decisions in pts regard.   
0.6 0.4   ALKPHOS 554* [phone]* 334*     Troponin: No results for input(s): \"TROPHS\" in the last 72 hours.  BNP: No results for input(s): \"BNP\" in the last 72 hours.  Lipids: No results for input(s): \"CHOL\", \"HDL\" in the last 72 hours.    Invalid input(s): \"LDLCALCU\"  INR:   Recent Labs     03/30/25  1015   INR 1.3   ECHO 10/1/24: reviewed.     Transesophageal echocardiogram for staph bacteremia.  Complex endocarditis is noted.     Large mobile vegetation noted on posterior mitral valve leaflet close to 2 cm.     Erosion of the base of the mitral valve leaflet towards the anterior leaflet is noted concerning for abscess formation.  There is a large perforation along the base of the mitral valve annulus with fistulous flow from the left ventricle with the left atrium.  There is also a vegetation   on the anterior leaflet along A2.  See image 14 and 16.     Severe mitral regurgitation is noted.     Aortic valve and pulmonic valve are unremarkable.     Moderate to large tricuspid valve vegetation is also noted with severe tricuspid regurgitation.     Small to moderate pericardial effusion, mostly along right ventricle.  Effusion is fibrinous..  No echocardiographic evidence of tamponade is present.    ECHO 3/30/25       Left Ventricle: Normal left ventricular systolic function. EF by visual approximation is 58%. Left ventricle size is normal. Normal wall thickness. Normal wall motion. Normal diastolic function.    Aortic Valve: Mild regurgitation.    Mitral Valve: Severe regurgitation. There is a small vegetation present that is mobile on the posterior leaflet.    Tricuspid Valve: Severe regurgitation. Mildly elevated RVSP, consistent with mild pulmonary hypertension. The estimated RVSP is 65 mmHg.    Left Atrium: Left atrium is mildly dilated.    Image quality is adequate.    Objective:   Vitals: BP (!) 154/103   Pulse (!) 108   Temp 99 °F (37.2 °C) (Axillary)   Resp 26   Ht 1.651 m (5' 5\")   Wt 56.2 kg (124 lb)

## 2025-04-02 NOTE — PROCEDURES
LONG-TERM EEG-VIDEO MONITORING   CLINICAL NEUROPHYSIOLOGY LABORATORY  DEPARTMENT OF NEUROLOGY  [facility]    Patient: Amy Wheat  Age: 39 y.o.  MRN: [id number]    Referring Physician: No ref. provider found  History: The patient is a 39 y.o. female who was found to have embolic strokes and continues to be altered. This long-term video-EEG monitoring study was performed to evaluate for seizures. The patient is on neuroactive medications.   Amy Wheat   Current Facility-Administered Medications   Medication Dose Route Frequency Provider Last Rate Last Admin    metoprolol (LOPRESSOR) injection 5 mg  5 mg IntraVENous Q6H NargisJenna farris APRN - CNP   5 mg at 04/01/25 1819    [START ON 4/2/2025] lacosamide (VIMPAT) 100 mg in sodium chloride 0.9 % 60 mL IVPB  100 mg IntraVENous BID Nicole Odell MD        levETIRAcetam (KEPPRA) injection 1,000 mg  1,000 mg IntraVENous Q12H Nicole Odell MD   1,000 mg at 04/01/25 1820    [Held by provider] vancomycin (VANCOCIN) 1,000 mg in sodium chloride 0.9 % 250 mL (addEASE) IVPB  1,000 mg IntraVENous Q8H Karli Gabriel PA   1,000 mg at 04/01/25 0300    sodium chloride flush 0.9 % injection 10 mL  10 mL IntraVENous PRN Nicole Odell MD   10 mL at 03/31/25 2054    sodium chloride flush 0.9 % injection 5-40 mL  5-40 mL IntraVENous 2 times per day Blake Lovett MD   10 mL at 04/01/25 0809    sodium chloride flush 0.9 % injection 5-40 mL  5-40 mL IntraVENous PRN Blake Lovett MD        0.9 % sodium chloride infusion   IntraVENous PRN Blake Lovett MD        [Held by provider] clonazePAM (KLONOPIN) tablet 0.5 mg  0.5 mg Oral TID PRN Karli Gabriel PA        [Held by provider] cloNIDine (CATAPRES) tablet 0.1 mg  0.1 mg Oral BID Karli Gabriel PA        [Held by provider] doxepin (SINEQUAN) capsule 10 mg  10 mg Oral Aravindly Karli Gabriel PA        [Held by provider] DULoxetine (CYMBALTA) extended release capsule 60 mg  60 mg Oral

## 2025-04-02 NOTE — PLAN OF CARE
Problem: Confusion  Goal: Confusion, delirium, dementia, or psychosis is improved or at baseline  Description: INTERVENTIONS:  1. Assess for possible contributors to thought disturbance, including medications, impaired vision or hearing, underlying metabolic abnormalities, dehydration, psychiatric diagnoses, and notify attending LIP  2. [place] high risk fall precautions, as indicated  3. Provide frequent short contacts to provide reality reorientation, refocusing and direction  4. Decrease environmental stimuli, including noise as appropriate  5. Monitor and intervene to maintain adequate nutrition, hydration, elimination, sleep and activity  6. If unable to ensure safety without constant attention obtain sitter and review sitter guidelines with assigned personnel  7. Initiate Psychosocial CNS and Spiritual Care consult, as indicated  Outcome: Not Progressing     Problem: Confusion  Goal: Confusion, delirium, dementia, or psychosis is improved or at baseline  Description: INTERVENTIONS:  1. Assess for possible contributors to thought disturbance, including medications, impaired vision or hearing, underlying metabolic abnormalities, dehydration, psychiatric diagnoses, and notify attending LIP  2. [place] high risk fall precautions, as indicated  3. Provide frequent short contacts to provide reality reorientation, refocusing and direction  4. Decrease environmental stimuli, including noise as appropriate  5. Monitor and intervene to maintain adequate nutrition, hydration, elimination, sleep and activity  6. If unable to ensure safety without constant attention obtain sitter and review sitter guidelines with assigned personnel  7. Initiate Psychosocial CNS and Spiritual Care consult, as indicated  Outcome: Not Progressing     
  Problem: Safety - Adult  Goal: Free from fall injury  3/30/2025 2139 by Izzy Smith RN  Outcome: Progressing  3/30/2025 1802 by Rocío Cat RN  Outcome: Progressing     Problem: Skin/Tissue Integrity  Goal: Skin integrity remains intact  Description: 1.  Monitor for areas of redness and/or skin breakdown  2.  Assess vascular access sites hourly  3.  Every 4-6 hours minimum:  Change oxygen saturation probe site  4.  Every 4-6 hours:  If on nasal continuous positive airway pressure, respiratory therapy assess nares and determine need for appliance change or resting period  3/30/2025 2139 by Izzy Smith RN  Outcome: Progressing  3/30/2025 1802 by Rocío Cat RN  Outcome: Progressing     Problem: Pain  Goal: Verbalizes/displays adequate comfort level or baseline comfort level  3/30/2025 2139 by Izzy Smith RN  Outcome: Progressing  3/30/2025 1802 by Rocío Cat RN  Outcome: Progressing  Flowsheets (Taken 3/30/2025 1802)  Verbalizes/displays adequate comfort level or baseline comfort level: Assess pain using appropriate pain scale     Problem: Discharge Planning  Goal: Discharge to home or other facility with appropriate resources  Outcome: Not Progressing     Problem: Confusion  Goal: Confusion, delirium, dementia, or psychosis is improved or at baseline  Description: INTERVENTIONS:  1. Assess for possible contributors to thought disturbance, including medications, impaired vision or hearing, underlying metabolic abnormalities, dehydration, psychiatric diagnoses, and notify attending LIP  2. [place] high risk fall precautions, as indicated  3. Provide frequent short contacts to provide reality reorientation, refocusing and direction  4. Decrease environmental stimuli, including noise as appropriate  5. Monitor and intervene to maintain adequate nutrition, hydration, elimination, sleep and activity  6. If unable to ensure safety without constant attention obtain sitter and 
Problem: Discharge Planning  Goal: Discharge to home or other facility with appropriate resources  3/31/2025 0925 by Radha Burris, RN  Outcome: Not Progressing     Problem: Confusion  Goal: Confusion, delirium, dementia, or psychosis is improved or at baseline  Description: INTERVENTIONS:  1. Assess for possible contributors to thought disturbance, including medications, impaired vision or hearing, underlying metabolic abnormalities, dehydration, psychiatric diagnoses, and notify attending LIP  2. [place] high risk fall precautions, as indicated  3. Provide frequent short contacts to provide reality reorientation, refocusing and direction  4. Decrease environmental stimuli, including noise as appropriate  5. Monitor and intervene to maintain adequate nutrition, hydration, elimination, sleep and activity  6. If unable to ensure safety without constant attention obtain sitter and review sitter guidelines with assigned personnel  7. Initiate Psychosocial CNS and Spiritual Care consult, as indicated  3/31/2025 0925 by Radha Burris, RN  Outcome: Not Progressing  Pt not talking at this time and is only responding to pain which is not her baseline. MDs aware and neurology is on her case.     Problem: Safety - Adult  Goal: Free from fall injury  3/31/2025 0925 by Radha Burris, RN  Outcome: Progressing   Pt remains free from falls and in fall precautions at this time. Bed is in lowest position with all wheels locked, 3/4 side rails up and bed alarm on. Call light & bedside table within reach of pt but pt unable to verbally respond at this time; RN making frequent rounds to ensure safety. Nurse will continue to assess and monitor pt with hourly rounds and offer toileting.      Problem: Skin/Tissue Integrity  Goal: Skin integrity remains intact  Description: 1.  Monitor for areas of redness and/or skin breakdown  2.  Assess vascular access sites hourly  3.  Every 4-6 hours minimum:  Change oxygen saturation probe site  4. 
adequate comfort level or baseline comfort level:   Encourage patient to monitor pain and request assistance   Assess pain using appropriate pain scale   Administer analgesics based on type and severity of pain and evaluate response     Problem: Infection - Adult  Goal: Absence of infection during hospitalization  Outcome: Progressing     Problem: Neurosensory - Adult  Goal: Achieves stable or improved neurological status  Outcome: Progressing  Goal: Absence of seizures  Outcome: Progressing  Goal: Remains free of injury related to seizures activity  Outcome: Progressing     Problem: Respiratory - Adult  Goal: Achieves optimal ventilation and oxygenation  Outcome: Progressing     Problem: Skin/Tissue Integrity - Adult  Goal: Incisions, wounds, or drain sites healing without S/S of infection  Outcome: Progressing  Goal: Oral mucous membranes remain intact  Outcome: Progressing     Problem: Musculoskeletal - Adult  Goal: Return mobility to safest level of function  Outcome: Progressing     Problem: Genitourinary - Adult  Goal: Absence of urinary retention  Outcome: Progressing     
preventative oral hygiene regimen     Problem: Musculoskeletal - Adult  Goal: Return mobility to safest level of function  Outcome: Progressing     Problem: Genitourinary - Adult  Goal: Absence of urinary retention  Outcome: Progressing

## 2025-06-19 ENCOUNTER — OFFICE VISIT (OUTPATIENT)
Dept: PRIMARY CARE CLINIC | Age: 40
End: 2025-06-19
Payer: COMMERCIAL

## 2025-06-19 VITALS
WEIGHT: 126 LBS | HEIGHT: 65 IN | OXYGEN SATURATION: 99 % | SYSTOLIC BLOOD PRESSURE: 122 MMHG | HEART RATE: 91 BPM | DIASTOLIC BLOOD PRESSURE: 90 MMHG | BODY MASS INDEX: 20.99 KG/M2 | TEMPERATURE: 97 F

## 2025-06-19 DIAGNOSIS — L08.9 STERNAL WOUND INFECTION: ICD-10-CM

## 2025-06-19 DIAGNOSIS — S21.101A STERNAL WOUND INFECTION: ICD-10-CM

## 2025-06-19 DIAGNOSIS — B19.20 HEPATITIS C VIRUS INFECTION WITHOUT HEPATIC COMA, UNSPECIFIED CHRONICITY: Primary | ICD-10-CM

## 2025-06-19 DIAGNOSIS — Z79.2 LONG TERM (CURRENT) USE OF ANTIBIOTICS: ICD-10-CM

## 2025-06-19 DIAGNOSIS — E55.9 VITAMIN D DEFICIENCY: ICD-10-CM

## 2025-06-19 DIAGNOSIS — Z86.73 HISTORY OF CARDIOEMBOLIC CEREBROVASCULAR ACCIDENT (CVA): ICD-10-CM

## 2025-06-19 DIAGNOSIS — F51.04 PSYCHOPHYSIOLOGICAL INSOMNIA: ICD-10-CM

## 2025-06-19 PROBLEM — I67.9 CEREBROVASCULAR DISEASE: Status: ACTIVE | Noted: 2025-03-31

## 2025-06-19 PROBLEM — B18.2 CHRONIC VIRAL HEPATITIS C (HCC): Status: ACTIVE | Noted: 2025-01-10

## 2025-06-19 PROCEDURE — 99204 OFFICE O/P NEW MOD 45 MIN: CPT | Performed by: NURSE PRACTITIONER

## 2025-06-19 RX ORDER — TRAZODONE HYDROCHLORIDE 50 MG/1
50 TABLET ORAL NIGHTLY
COMMUNITY
Start: 2025-06-09 | End: 2025-06-19 | Stop reason: SDUPTHER

## 2025-06-19 RX ORDER — GABAPENTIN 300 MG/1
300 CAPSULE ORAL 3 TIMES DAILY
Qty: 90 CAPSULE | Refills: 1 | Status: SHIPPED | OUTPATIENT
Start: 2025-06-19 | End: 2025-08-18

## 2025-06-19 RX ORDER — TRAZODONE HYDROCHLORIDE 50 MG/1
50 TABLET ORAL NIGHTLY
Qty: 90 TABLET | Refills: 0 | Status: SHIPPED | OUTPATIENT
Start: 2025-06-19

## 2025-06-19 RX ORDER — DOXYCYCLINE 100 MG/1
100 CAPSULE ORAL 2 TIMES DAILY
COMMUNITY
Start: 2025-05-30 | End: 2025-06-19 | Stop reason: SDUPTHER

## 2025-06-19 RX ORDER — BUPRENORPHINE AND NALOXONE 4; 1 MG/1; MG/1
FILM, SOLUBLE BUCCAL; SUBLINGUAL
COMMUNITY
Start: 2025-06-09

## 2025-06-19 RX ORDER — DOXEPIN HYDROCHLORIDE 10 MG/1
10 CAPSULE ORAL NIGHTLY
Qty: 30 CAPSULE | Refills: 1 | Status: SHIPPED | OUTPATIENT
Start: 2025-06-19

## 2025-06-19 RX ORDER — CHOLECALCIFEROL (VITAMIN D3) 50 MCG
2000 TABLET ORAL DAILY
Qty: 30 TABLET | Refills: 5 | Status: SHIPPED | OUTPATIENT
Start: 2025-06-19

## 2025-06-19 RX ORDER — DOXYCYCLINE 100 MG/1
100 CAPSULE ORAL 2 TIMES DAILY
Qty: 60 CAPSULE | Refills: 1 | Status: SHIPPED | OUTPATIENT
Start: 2025-06-19

## 2025-06-19 ASSESSMENT — PATIENT HEALTH QUESTIONNAIRE - PHQ9
2. FEELING DOWN, DEPRESSED OR HOPELESS: NOT AT ALL
SUM OF ALL RESPONSES TO PHQ QUESTIONS 1-9: 0
1. LITTLE INTEREST OR PLEASURE IN DOING THINGS: NOT AT ALL
SUM OF ALL RESPONSES TO PHQ QUESTIONS 1-9: 0

## 2025-06-19 NOTE — PROGRESS NOTES
[address] [facility] MAIN FLOOR  [facility] [address]   6/19/2025    Amy Wheat is a 40 y.o. female who presents today for her medical conditions and/or complaints as noted below.    Amy Wheat is scheduled today for New Patient ([facility])  .      HPI:     History of Present Illness  The patient is a 40-year-old female who presents today to [facility].    She has a history of rheumatic mitral valve, nonrheumatic mitral valve insufficiency, and is a current smoker. She had a hospital stay on 03/30/2025 for altered mental status with pneumonia and septicemia. Other concerns included infections due to trichomonas, sepsis due to MRSA, cerebral multi-infarct state, acute encephalopathy with cerebral septic emboli, history of polysubstance use disorder with mitral valve, tricuspid valve, MRSA bacteremia, history of PEG tube, and history of stroke in 12/2024 with right-sided deficits. She was transferred to [facility] for surgical evaluation for the mitral valve vegetation. She was at [facility] from 04/02/2025 through 05/13/2025. She was evaluated by cardiothoracic surgery and not deemed to be a good surgical candidate but was to be reevaluated after showing improvement in mental state. However, there was a right sternoclavicular septic joint causing concern for surgery. She underwent SCD joint debridement with IV antibiotics, worked with speech pathology to improve cognition and speech, and received tube feeds. During her visit, she experienced an acute kidney injury and was treated for a UTI. Plans were made to reassess cardiac surgery as an outpatient. She received 6 weeks of vancomycin through 05/29/2025 and then a course of doxycycline suppressive therapy every 12 hours, 100 mg until she undergoes cardiac surgery. She was discharged on nutrition 1.5 with a feed rate of 55 mL an hour for 12 hours. Speech therapy recommended a minced and moist diet with thin

## 2025-07-01 NOTE — DISCHARGE INSTRUCTIONS
[facility] -Phone: [phone] Fax: [phone]   Visit  Discharge Instructions / Physician Orders  DATE: 7/2/2025     SUPPLIES ORDERED THRU: [employer]     Wound Location: Right Chest     Cleanse with: Liquid antibacterial soap and water, rinse well      Dressing Orders: Collagen with Silver to wound-moisten with saline, Silicone Border Bandage     Frequency: Daily     Additional Orders: Increase protein to diet (meat, cheese, eggs, fish, peanut butter, nuts and beans)    Your next appointment with Wound Care Center is in 2 weeks with Dr. Alex     Please note your next appointment above and if you are unable to keep, kindly give a 24 hour notice.  If more than 15 min late we cannot guarantee you will be seen due to clinician schedule  Per Policy, Excessive cancellation will call for dismissal from program.     If you experience any of the following, please call the Wound Care Center during business hours:  [phone]       * Increase in Pain                                              * Temperature over 101       * Increase in drainage from your wound               * Drainage with a foul odor       * Bleeding                                                               * Increase in swelling       * Need for compression bandage changes due to slippage, breakthrough drainage.   If you need medical attention outside of the business hours of the Wound Care Centers please contact your PCP or go to the an Urgent Care or Emergency Department  The information contained in the After Visit Summary has been reviewed with me, the patient and/or responsible adult, by my health care provider(s). I had the opportunity to ask questions regarding this information. I have elected to receive;      []After Visit Summary  [x]Comprehensive Discharge Instruction      Patient signature______________________________________Date:________  Electronically signed by Isaiah Scott RN on 7/2/2025 at 1:51

## 2025-07-02 ENCOUNTER — HOSPITAL ENCOUNTER (OUTPATIENT)
Dept: WOUND CARE | Age: 40
Discharge: HOME | End: 2025-07-02
Payer: COMMERCIAL

## 2025-07-02 VITALS
WEIGHT: 126 LBS | HEART RATE: 89 BPM | BODY MASS INDEX: 20.99 KG/M2 | DIASTOLIC BLOOD PRESSURE: 83 MMHG | TEMPERATURE: 98.6 F | SYSTOLIC BLOOD PRESSURE: 148 MMHG | HEIGHT: 65 IN

## 2025-07-02 DIAGNOSIS — S21.101A OPEN WOUND OF RIGHT CHEST WALL, INITIAL ENCOUNTER: Primary | ICD-10-CM

## 2025-07-02 PROCEDURE — 99213 OFFICE O/P EST LOW 20 MIN: CPT

## 2025-07-02 PROCEDURE — 11042 DBRDMT SUBQ TIS 1ST 20SQCM/<: CPT

## 2025-07-02 RX ORDER — LIDOCAINE HYDROCHLORIDE 20 MG/ML
JELLY TOPICAL PRN
OUTPATIENT
Start: 2025-07-02

## 2025-07-02 RX ORDER — SENNOSIDES 8.6 MG/1
1 TABLET ORAL PRN
COMMUNITY

## 2025-07-02 RX ORDER — CLOBETASOL PROPIONATE 0.5 MG/G
OINTMENT TOPICAL PRN
OUTPATIENT
Start: 2025-07-02

## 2025-07-02 RX ORDER — BACITRACIN ZINC AND POLYMYXIN B SULFATE 500; 1000 [USP'U]/G; [USP'U]/G
OINTMENT TOPICAL PRN
OUTPATIENT
Start: 2025-07-02

## 2025-07-02 RX ORDER — SODIUM CHLOR/HYPOCHLOROUS ACID 0.033 %
SOLUTION, IRRIGATION IRRIGATION PRN
OUTPATIENT
Start: 2025-07-02

## 2025-07-02 RX ORDER — LIDOCAINE 50 MG/G
OINTMENT TOPICAL PRN
OUTPATIENT
Start: 2025-07-02

## 2025-07-02 RX ORDER — GENTAMICIN SULFATE 1 MG/G
OINTMENT TOPICAL PRN
OUTPATIENT
Start: 2025-07-02

## 2025-07-02 RX ORDER — NEOMYCIN/BACITRACIN/POLYMYXINB 3.5-400-5K
OINTMENT (GRAM) TOPICAL PRN
OUTPATIENT
Start: 2025-07-02

## 2025-07-02 RX ORDER — BETAMETHASONE DIPROPIONATE 0.5 MG/G
CREAM TOPICAL PRN
OUTPATIENT
Start: 2025-07-02

## 2025-07-02 RX ORDER — MUPIROCIN 2 %
OINTMENT (GRAM) TOPICAL PRN
OUTPATIENT
Start: 2025-07-02

## 2025-07-02 RX ORDER — TRIAMCINOLONE ACETONIDE 1 MG/G
OINTMENT TOPICAL PRN
OUTPATIENT
Start: 2025-07-02

## 2025-07-02 RX ORDER — LIDOCAINE 40 MG/G
CREAM TOPICAL PRN
OUTPATIENT
Start: 2025-07-02

## 2025-07-02 RX ORDER — GINSENG 100 MG
CAPSULE ORAL PRN
OUTPATIENT
Start: 2025-07-02

## 2025-07-02 RX ORDER — LIDOCAINE HYDROCHLORIDE 40 MG/ML
SOLUTION TOPICAL PRN
OUTPATIENT
Start: 2025-07-02

## 2025-07-02 NOTE — PROGRESS NOTES
Wound Care Supplies      Supply Company:     [employer]- DO NOT FORWARD- Please notify [facility] if unable to service patient  Phone: [phone]  Fax: [phone]    Ordering Center:     [facility]  [address] [place] [id number]  [phone]  WOUND CARE Dept: [phone]   FAX NUMBER [phone]    Patient Information:      Amy Wheat  [address]  [facility] [address]   [phone]   : 1985  AGE: 40 y.o.     GENDER: female   TODAYS DATE:  2025    Insurance:      PRIMARY INSURANCE:  Plan: [employer]  Coverage: [employer]  Effective Date: 3/1/2024  [id number] - (Medicaid Managed)    Patient Wound Information:      Diagnoses       Codes Comments   Open wound of right chest wall, initial encounter  - Primary S21.101A        WOUNDS REQUIRING DRESSING SUPPLIES:     Wound 25 Chest Right;Upper (Active)   Number of days: 93       Wound 25 Breast Right;Proximal #1 (Active)   Wound Image   25 1320   Wound Etiology Surgical 25 1320   Dressing Status Old drainage noted 25 1320   Wound Cleansed Cleansed with saline 25 1320   Wound Length (cm) 1.8 cm 25 1320   Wound Width (cm) 1.3 cm 25 1320   Wound Depth (cm) 1.2 cm 25 1320   Wound Surface Area (cm^2) 2.34 cm^2 25 1320   Wound Volume (cm^3) 2.808 cm^3 25 1320   Post-Procedure Length (cm) 1.8 cm 25 1320   Post-Procedure Width (cm) 1.3 cm 25 1320   Post-Procedure Depth (cm) 1.2 cm 25 1320   Post-Procedure Surface Area (cm^2) 2.34 cm^2 25 1320   Post-Procedure Volume (cm^3) 2.808 cm^3 25 1320   Undermining Starts ___ O'Clock 11 25 1320   Undermining Ends___ O'Clock 1 25 1320   Undermining Maxium Distance (cm) 1.3cm at 1 25 1320   Wound Assessment Pink/red 25 1320   Drainage Amount Moderate (25-50%) 25 1320   Drainage Description Serosanguinous 25 1320   Odor None 25 1320

## 2025-07-02 NOTE — PROGRESS NOTES
[facility]   Progress Note and Procedure Note      Amy Wheat  MEDICAL RECORD NUMBER:  [id number]  AGE: 40 y.o.   GENDER: female  : 1985  EPISODE DATE:  2025    Subjective:     Chief Complaint   Patient presents with    Wound Check     R breast         HISTORY of PRESENT ILLNESS HPI     Amy Wheat is a 40 y.o. female who presents today for wound/ulcer evaluation.   History of Wound Context: dehiscence of chest wall wound.  Hx of debridement of right sternoclavicular joint for infection.  Patient has subsequent flap closure of wound.  Patient denies pain.  Denies any copious drainage from wound.  Denies fevers, chills, or sweats.  Denies nausea or vomiting.      Wound/Ulcer Pain Timing/Severity: none  Quality of pain: N/A  Severity:  0 / 10   Modifying Factors: None  Associated Signs/Symptoms: none    Ulcer Identification:  Ulcer Type: non-healing surgical  Contributing Factors: none    Wound: Wound dehiscence        PAST MEDICAL HISTORY        Diagnosis Date    CVA (cerebral vascular accident) (HCC)     Endocarditis     Hepatitis C     Heroin abuse (HCC)     MRSA (methicillin resistant staph aureus) culture positive     Seizures (HCC)        PAST SURGICAL HISTORY    No past surgical history on file.    FAMILY HISTORY    No family history on file.    SOCIAL HISTORY    Social History     Tobacco Use    Smoking status: Former     Types: Cigarettes   Substance Use Topics    Alcohol use: No     Comment: occasionally     Drug use: Yes     Types: IV     Comment: uses cocaine and heroin regularly        ALLERGIES    Allergies   Allergen Reactions    Bactrim [Sulfamethoxazole-Trimethoprim]        MEDICATIONS    Current Outpatient Medications on File Prior to Encounter   Medication Sig Dispense Refill    buprenorphine-naloxone (SUBOXONE) 4-1 MG FILM SL film       melatonin 3 MG TABS tablet Take 1 tablet by mouth nightly as needed      doxycycline hyclate (VIBRAMYCIN) 100

## 2025-07-30 NOTE — DISCHARGE INSTRUCTIONS
[facility] -Phone: [phone] Fax: [phone]    Visit  Discharge Instructions / Physician Orders  DATE: 7/31/2025     SUPPLIES ORDERED THRU: [employer]     Wound Location: Right Chest     Cleanse with: Liquid antibacterial soap and water, rinse well      Dressing Orders: Collagen with Silver to wound-moisten with saline, Silicone Border Bandage     Frequency: Daily     Additional Orders: Increase protein to diet (meat, cheese, eggs, fish, peanut butter, nuts and beans)     Your next appointment with Wound Care Center is in 2 weeks with Dr. Alex     Please note your next appointment above and if you are unable to keep, kindly give a 24 hour notice.  If more than 15 min late we cannot guarantee you will be seen due to clinician schedule  Per Policy, Excessive cancellation will call for dismissal from program.     If you experience any of the following, please call the Wound Care Center during business hours:  [phone]        * Increase in Pain                                                   * Temperature over 101       * Increase in drainage from your wound                 * Drainage with a foul odor       * Bleeding                                                               * Increase in swelling       * Need for compression bandage changes due to slippage, breakthrough drainage.   If you need medical attention outside of the business hours of the Wound Care Centers please contact your PCP or go to the an Urgent Care or Emergency Department  The information contained in the After Visit Summary has been reviewed with me, the patient and/or responsible adult, by my health care provider(s). I had the opportunity to ask questions regarding this information. I have elected to receive;      []After Visit Summary  [x]Comprehensive Discharge Instruction        Patient signature______________________________________Date:________  Electronically signed by Isaiah Scott RN on 7/31/2025 at

## 2025-07-31 ENCOUNTER — OFFICE VISIT (OUTPATIENT)
Dept: PRIMARY CARE CLINIC | Age: 40
End: 2025-07-31
Payer: COMMERCIAL

## 2025-07-31 ENCOUNTER — HOSPITAL ENCOUNTER (OUTPATIENT)
Dept: WOUND CARE | Age: 40
Discharge: HOME | End: 2025-07-31
Payer: COMMERCIAL

## 2025-07-31 ENCOUNTER — CARE COORDINATION (OUTPATIENT)
Dept: CARE COORDINATION | Age: 40
End: 2025-07-31

## 2025-07-31 VITALS
OXYGEN SATURATION: 100 % | TEMPERATURE: 97 F | BODY MASS INDEX: 20.66 KG/M2 | WEIGHT: 124 LBS | SYSTOLIC BLOOD PRESSURE: 117 MMHG | HEART RATE: 102 BPM | DIASTOLIC BLOOD PRESSURE: 87 MMHG | HEIGHT: 65 IN

## 2025-07-31 VITALS
TEMPERATURE: 98.2 F | WEIGHT: 124 LBS | HEART RATE: 112 BPM | HEIGHT: 65 IN | DIASTOLIC BLOOD PRESSURE: 88 MMHG | RESPIRATION RATE: 16 BRPM | BODY MASS INDEX: 20.66 KG/M2 | SYSTOLIC BLOOD PRESSURE: 127 MMHG

## 2025-07-31 DIAGNOSIS — Z86.73 HISTORY OF CARDIOEMBOLIC CEREBROVASCULAR ACCIDENT (CVA): ICD-10-CM

## 2025-07-31 DIAGNOSIS — Z79.2 LONG TERM (CURRENT) USE OF ANTIBIOTICS: ICD-10-CM

## 2025-07-31 DIAGNOSIS — B19.20 HEPATITIS C VIRUS INFECTION WITHOUT HEPATIC COMA, UNSPECIFIED CHRONICITY: ICD-10-CM

## 2025-07-31 DIAGNOSIS — M54.50 CHRONIC BILATERAL LOW BACK PAIN WITHOUT SCIATICA: ICD-10-CM

## 2025-07-31 DIAGNOSIS — G89.29 CHRONIC BILATERAL LOW BACK PAIN WITHOUT SCIATICA: ICD-10-CM

## 2025-07-31 DIAGNOSIS — F51.04 PSYCHOPHYSIOLOGICAL INSOMNIA: Primary | ICD-10-CM

## 2025-07-31 DIAGNOSIS — I34.0 SEVERE MITRAL REGURGITATION: ICD-10-CM

## 2025-07-31 DIAGNOSIS — M00.9 JOINT INFECTION (HCC): ICD-10-CM

## 2025-07-31 DIAGNOSIS — S21.101A OPEN WOUND OF RIGHT CHEST WALL, INITIAL ENCOUNTER: Primary | ICD-10-CM

## 2025-07-31 DIAGNOSIS — I38 ENDOCARDITIS, UNSPECIFIED CHRONICITY, UNSPECIFIED ENDOCARDITIS TYPE: ICD-10-CM

## 2025-07-31 PROCEDURE — 99214 OFFICE O/P EST MOD 30 MIN: CPT | Performed by: NURSE PRACTITIONER

## 2025-07-31 PROCEDURE — 11042 DBRDMT SUBQ TIS 1ST 20SQCM/<: CPT

## 2025-07-31 RX ORDER — GENTAMICIN SULFATE 1 MG/G
OINTMENT TOPICAL PRN
OUTPATIENT
Start: 2025-07-31

## 2025-07-31 RX ORDER — LIDOCAINE HYDROCHLORIDE 20 MG/ML
JELLY TOPICAL PRN
Status: DISCONTINUED | OUTPATIENT
Start: 2025-07-31 | End: 2025-08-01 | Stop reason: HOSPADM

## 2025-07-31 RX ORDER — CLOBETASOL PROPIONATE 0.5 MG/G
OINTMENT TOPICAL PRN
OUTPATIENT
Start: 2025-07-31

## 2025-07-31 RX ORDER — LIDOCAINE 50 MG/G
OINTMENT TOPICAL PRN
OUTPATIENT
Start: 2025-07-31

## 2025-07-31 RX ORDER — TRIAMCINOLONE ACETONIDE 1 MG/G
OINTMENT TOPICAL PRN
OUTPATIENT
Start: 2025-07-31

## 2025-07-31 RX ORDER — BACITRACIN ZINC AND POLYMYXIN B SULFATE 500; 1000 [USP'U]/G; [USP'U]/G
OINTMENT TOPICAL PRN
OUTPATIENT
Start: 2025-07-31

## 2025-07-31 RX ORDER — METHOCARBAMOL 750 MG/1
750 TABLET, FILM COATED ORAL 3 TIMES DAILY
Qty: 90 TABLET | Refills: 2 | Status: SHIPPED | OUTPATIENT
Start: 2025-07-31 | End: 2025-10-29

## 2025-07-31 RX ORDER — LIDOCAINE HYDROCHLORIDE 20 MG/ML
JELLY TOPICAL PRN
OUTPATIENT
Start: 2025-07-31

## 2025-07-31 RX ORDER — LIDOCAINE HYDROCHLORIDE 40 MG/ML
SOLUTION TOPICAL PRN
OUTPATIENT
Start: 2025-07-31

## 2025-07-31 RX ORDER — METHOCARBAMOL 750 MG/1
750 TABLET, FILM COATED ORAL 3 TIMES DAILY
COMMUNITY
Start: 2025-05-13 | End: 2025-07-31 | Stop reason: SDUPTHER

## 2025-07-31 RX ORDER — SODIUM CHLOR/HYPOCHLOROUS ACID 0.033 %
SOLUTION, IRRIGATION IRRIGATION PRN
OUTPATIENT
Start: 2025-07-31

## 2025-07-31 RX ORDER — GINSENG 100 MG
CAPSULE ORAL PRN
OUTPATIENT
Start: 2025-07-31

## 2025-07-31 RX ORDER — MUPIROCIN 2 %
OINTMENT (GRAM) TOPICAL PRN
OUTPATIENT
Start: 2025-07-31

## 2025-07-31 RX ORDER — LIDOCAINE 40 MG/G
CREAM TOPICAL PRN
OUTPATIENT
Start: 2025-07-31

## 2025-07-31 RX ORDER — NEOMYCIN/BACITRACIN/POLYMYXINB 3.5-400-5K
OINTMENT (GRAM) TOPICAL PRN
OUTPATIENT
Start: 2025-07-31

## 2025-07-31 RX ORDER — BETAMETHASONE DIPROPIONATE 0.5 MG/G
CREAM TOPICAL PRN
OUTPATIENT
Start: 2025-07-31

## 2025-07-31 SDOH — ECONOMIC STABILITY: FOOD INSECURITY: WITHIN THE PAST 12 MONTHS, THE FOOD YOU BOUGHT JUST DIDN'T LAST AND YOU DIDN'T HAVE MONEY TO GET MORE.: NEVER TRUE

## 2025-07-31 SDOH — HEALTH STABILITY: PHYSICAL HEALTH: ON AVERAGE, HOW MANY DAYS PER WEEK DO YOU ENGAGE IN MODERATE TO STRENUOUS EXERCISE (LIKE A BRISK WALK)?: 2 DAYS

## 2025-07-31 SDOH — ECONOMIC STABILITY: INCOME INSECURITY: IN THE LAST 12 MONTHS, WAS THERE A TIME WHEN YOU WERE NOT ABLE TO PAY THE MORTGAGE OR RENT ON TIME?: NO

## 2025-07-31 SDOH — HEALTH STABILITY: PHYSICAL HEALTH: ON AVERAGE, HOW MANY MINUTES DO YOU ENGAGE IN EXERCISE AT THIS LEVEL?: 10 MIN

## 2025-07-31 SDOH — ECONOMIC STABILITY: FOOD INSECURITY: WITHIN THE PAST 12 MONTHS, YOU WORRIED THAT YOUR FOOD WOULD RUN OUT BEFORE YOU GOT MONEY TO BUY MORE.: NEVER TRUE

## 2025-07-31 SDOH — ECONOMIC STABILITY: TRANSPORTATION INSECURITY
IN THE PAST 12 MONTHS, HAS LACK OF TRANSPORTATION KEPT YOU FROM MEETINGS, WORK, OR FROM GETTING THINGS NEEDED FOR DAILY LIVING?: YES

## 2025-07-31 SDOH — ECONOMIC STABILITY: TRANSPORTATION INSECURITY
IN THE PAST 12 MONTHS, HAS THE LACK OF TRANSPORTATION KEPT YOU FROM MEDICAL APPOINTMENTS OR FROM GETTING MEDICATIONS?: YES

## 2025-07-31 ASSESSMENT — LIFESTYLE VARIABLES
HOW MANY STANDARD DRINKS CONTAINING ALCOHOL DO YOU HAVE ON A TYPICAL DAY: 1 OR 2
HOW OFTEN DO YOU HAVE A DRINK CONTAINING ALCOHOL: MONTHLY OR LESS

## 2025-07-31 ASSESSMENT — SOCIAL DETERMINANTS OF HEALTH (SDOH)
DO YOU BELONG TO ANY CLUBS OR ORGANIZATIONS SUCH AS CHURCH GROUPS UNIONS, FRATERNAL OR ATHLETIC GROUPS, OR SCHOOL GROUPS?: NO
HOW OFTEN DO YOU GET TOGETHER WITH FRIENDS OR RELATIVES?: ONCE A WEEK
IN A TYPICAL WEEK, HOW MANY TIMES DO YOU TALK ON THE PHONE WITH FAMILY, FRIENDS, OR NEIGHBORS?: THREE TIMES A WEEK
HOW OFTEN DO YOU ATTEND CHURCH OR RELIGIOUS SERVICES?: NEVER
HOW OFTEN DO YOU ATTENT MEETINGS OF THE CLUB OR ORGANIZATION YOU BELONG TO?: NEVER

## 2025-07-31 NOTE — CARE COORDINATION
Ambulatory Care Coordination Note     2025 2:06 PM     Patient Current Location:  [address]  [facility] [address]     This patient was received as a referral from Provider.    ACM contacted the patient by telephone. Verified name and  with patient as identifiers. Provided introduction to self, and explanation of the ACM role.   Patient accepted care management services at this time.          ACM: Thom Feliciano RN     Challenges to be reviewed by the provider   Additional needs identified to be addressed with provider No  N/A               Method of communication with provider: none.    Utilization: Initial Call - N/A    Care Summary Note: spoke with patient, states that she's having a hard time with appointment, medications. States that her memory is just ok since CVA. Will assist with transportation, Transportation and getting medications pill packed    Offered patient enrollment in the Remote Patient Monitoring (RPM) program for in-home monitoring: Patient is not eligible for RPM program because: patient does not have qualifying diagnosis.     Assessments Completed:   Social Drivers of Health     Tobacco Use: Medium Risk (2025)    Patient History     Smoking Tobacco Use: Former     Smokeless Tobacco Use: Unknown     Passive Exposure: Not on file   Alcohol Use: Not At Risk (2025)    AUDIT-C     Frequency of Alcohol Consumption: Monthly or less     Average Number of Drinks: 1 or 2     Frequency of Binge Drinking: Never   Financial Resource Strain: High Risk (1/10/2025)    Received from [facility]    Overall Financial Resource Strain (CARDIA)     Difficulty of Paying Living Expenses: Very hard   Food Insecurity: No Food Insecurity (2025)    Hunger Vital Sign     Worried About Running Out of Food in the Last Year: Never true     Ran Out of Food in the Last Year: Never true   Transportation Needs: Unmet Transportation Needs (2025)    PRAPARE - Transportation      Osvaldo Echols  Interventional Neuroradiology  [address] [place] [address], [address]  [place] [address]  Phone: [phone]  Fax: [phone]  Follow Up Time:     Donovan Rojo  Neurology  [address] [place] [address], [address] [address]  Phone: [phone]  Fax: [phone]  Follow Up Time:

## 2025-07-31 NOTE — PROGRESS NOTES
scheduled for 08/12/2025. She is seeing the wound clinic in [place].    She reports improvement in her incision and has an upcoming appointment with the wound clinic today. Her father assists her with wound care and bandaging. She missed her cardiology appointment at [facility] on 06/12/2025 due to technical difficulties with the video visit. She is scheduled to see a gastroenterologist at [facility] on 08/12/2025 for her hepatitis C treatment. She has not contacted [facility] regarding the referral information provided last month. She is currently off her antibiotics and needs refills. She has been taking spironolactone and clonidine twice daily but forgot to bring them today.    She has been experiencing lower back pain, which she believes is due to a pulled muscle. The pain does not radiate down her legs. She has been using lidocaine patches for her back pain but forgot to bring them during her 3-week stay in [place] with her father.    She has been unable to attend speech therapy and physical therapy due to transportation issues. Her father notes that her speech has been improving weekly.    She has been gradually reducing her Suboxone dosage and is considering discontinuing it completely.    She has been taking gabapentin for chronic pain and duloxetine for depression and chronic pain. She has not scheduled an appointment with Zofran.    She has been taking trazodone but ran out of it.    She has been taking vitamin D supplements.    She has been taking methocarbamol for muscle relaxation.    She has been taking doxepin.    She has been taking melatonin.    She has been taking clonidine twice daily.    She has been taking spironolactone.    She has been taking doxycycline twice daily.    Tobacco: She smokes cigarettes.      Vitals:    07/31/25 0820   BP: 117/87   BP Site: Right Upper Arm   Patient Position: Sitting   BP Cuff Size: Medium Adult   Pulse: (!) 102   Temp: 97 °F (36.1 °C)   TempSrc:

## 2025-07-31 NOTE — PROGRESS NOTES
[facility]   Progress Note and Procedure Note      Amy Wheat  MEDICAL RECORD NUMBER:  [id number]  AGE: 40 y.o.   GENDER: female  : 1985  EPISODE DATE:  2025    Subjective:     Chief Complaint   Patient presents with    Wound Check     Right breast         HISTORY of PRESENT ILLNESS HPI     Amy Wheat is a 40 y.o. female who presents today for wound/ulcer evaluation.   History of Wound Context: The patient is here for right upper chest wound with no purulent drainage, no bone exposure, no surrounding redness.  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  The patient received IV vancomycin through 2025 followed by doxycycline for chronic suppression, followed by infectious disease at [facility].  History of IV drug injection and chronic hepatitis C  She denied fever or chills, denied nausea or vomiting, no pain to the wound, no diarrhea.    PAST MEDICAL HISTORY        Diagnosis Date    CVA (cerebral vascular accident) (HCC)     Endocarditis     Hepatitis C     Heroin abuse (HCC)     MRSA (methicillin resistant staph aureus) culture positive     Seizures (HCC)        PAST SURGICAL HISTORY    History reviewed. No pertinent surgical history.    FAMILY HISTORY    History reviewed. No pertinent family history.    SOCIAL HISTORY    Social History     Tobacco Use    Smoking status: Former     Types: Cigarettes   Vaping Use    Vaping status: Never Used   Substance Use Topics    Alcohol use: No     Comment: occasionally     Drug use: Yes     Types: IV     Comment: uses cocaine and heroin regularly        ALLERGIES    Allergies   Allergen Reactions    Bactrim [Sulfamethoxazole-Trimethoprim]        MEDICATIONS    Current Outpatient Medications on File Prior to Encounter

## 2025-07-31 NOTE — PATIENT INSTRUCTIONS
Speech Therapy and Physical Therapy.. Call to rescheudle  [] [facility]  Outpatient Rehabilitation &  Therapy  [address]  P: [phone]

## 2025-08-14 ENCOUNTER — HOSPITAL ENCOUNTER (OUTPATIENT)
Dept: WOUND CARE | Age: 40
Discharge: HOME | End: 2025-08-14